# Patient Record
Sex: FEMALE | Race: WHITE | NOT HISPANIC OR LATINO
[De-identification: names, ages, dates, MRNs, and addresses within clinical notes are randomized per-mention and may not be internally consistent; named-entity substitution may affect disease eponyms.]

---

## 2018-02-26 ENCOUNTER — LABORATORY RESULT (OUTPATIENT)
Age: 68
End: 2018-02-26

## 2018-02-26 ENCOUNTER — APPOINTMENT (OUTPATIENT)
Dept: NEPHROLOGY | Facility: CLINIC | Age: 68
End: 2018-02-26

## 2018-02-26 ENCOUNTER — APPOINTMENT (OUTPATIENT)
Dept: NEPHROLOGY | Facility: CLINIC | Age: 68
End: 2018-02-26
Payer: MEDICARE

## 2018-02-26 VITALS — HEART RATE: 84 BPM | SYSTOLIC BLOOD PRESSURE: 148 MMHG | DIASTOLIC BLOOD PRESSURE: 92 MMHG

## 2018-02-26 VITALS — OXYGEN SATURATION: 96 % | HEART RATE: 88 BPM | WEIGHT: 168 LBS | HEIGHT: 60 IN | BODY MASS INDEX: 32.98 KG/M2

## 2018-02-26 VITALS — HEART RATE: 108 BPM | DIASTOLIC BLOOD PRESSURE: 100 MMHG | SYSTOLIC BLOOD PRESSURE: 164 MMHG

## 2018-02-26 VITALS — DIASTOLIC BLOOD PRESSURE: 100 MMHG | SYSTOLIC BLOOD PRESSURE: 174 MMHG

## 2018-02-26 VITALS — HEART RATE: 108 BPM

## 2018-02-26 VITALS — DIASTOLIC BLOOD PRESSURE: 100 MMHG | HEART RATE: 96 BPM | SYSTOLIC BLOOD PRESSURE: 160 MMHG

## 2018-02-26 DIAGNOSIS — Z00.00 ENCOUNTER FOR GENERAL ADULT MEDICAL EXAMINATION W/OUT ABNORMAL FINDINGS: ICD-10-CM

## 2018-02-26 PROCEDURE — 99205 OFFICE O/P NEW HI 60 MIN: CPT | Mod: 25

## 2018-02-26 PROCEDURE — 36415 COLL VENOUS BLD VENIPUNCTURE: CPT

## 2018-02-26 PROCEDURE — 93000 ELECTROCARDIOGRAM COMPLETE: CPT

## 2018-02-28 LAB
24R-OH-CALCIDIOL SERPL-MCNC: 64.9 PG/ML
25(OH)D3 SERPL-MCNC: 18 NG/ML
ALBUMIN SERPL ELPH-MCNC: 4.1 G/DL
ALDOSTERONE SERUM: 7.4 NG/DL
ALP BLD-CCNC: 77 U/L
ALP BONE SERPL-MCNC: 18 MCG/L
ALT SERPL-CCNC: 13 U/L
ANION GAP SERPL CALC-SCNC: 17 MMOL/L
APPEARANCE: CLEAR
AST SERPL-CCNC: 24 U/L
BACTERIA UR CULT: NORMAL
BACTERIA: ABNORMAL
BASOPHILS # BLD AUTO: 0.02 K/UL
BASOPHILS NFR BLD AUTO: 0.3 %
BILIRUB SERPL-MCNC: 0.5 MG/DL
BILIRUBIN URINE: NEGATIVE
BLOOD URINE: NEGATIVE
BUN SERPL-MCNC: 20 MG/DL
C3 SERPL-MCNC: 135 MG/DL
C4 SERPL-MCNC: 37 MG/DL
CALCIUM SERPL-MCNC: 9.6 MG/DL
CALCIUM SERPL-MCNC: 9.6 MG/DL
CHLORIDE SERPL-SCNC: 103 MMOL/L
CHOLEST SERPL-MCNC: 181 MG/DL
CHOLEST/HDLC SERPL: 3.9 RATIO
CO2 SERPL-SCNC: 22 MMOL/L
COLLAGEN CTX SERPL-MCNC: 189 PG/ML
COLOR: YELLOW
CORTIS SERPL-MCNC: 7.3 UG/DL
CREAT SERPL-MCNC: 0.91 MG/DL
CREAT SPEC-SCNC: 81 MG/DL
CREAT/PROT UR: 0.1 RATIO
CRP SERPL-MCNC: 0.3 MG/DL
EOSINOPHIL # BLD AUTO: 0.11 K/UL
EOSINOPHIL NFR BLD AUTO: 1.4 %
ERYTHROCYTE [SEDIMENTATION RATE] IN BLOOD BY WESTERGREN METHOD: 26 MM/HR
FERRITIN SERPL-MCNC: 182 NG/ML
FOLATE SERPL-MCNC: 9.2 NG/ML
GLUCOSE QUALITATIVE U: NEGATIVE MG/DL
GLUCOSE SERPL-MCNC: 81 MG/DL
HBA1C MFR BLD HPLC: 5.4 %
HBV SURFACE AB SER QL: NONREACTIVE
HBV SURFACE AG SER QL: NONREACTIVE
HCT VFR BLD CALC: 43.3 %
HCV AB SER QL: NONREACTIVE
HCV S/CO RATIO: 0.1 S/CO
HCYS SERPL-MCNC: 8.5 UMOL/L
HDLC SERPL-MCNC: 47 MG/DL
HGB BLD-MCNC: 14 G/DL
HYALINE CASTS: 1 /LPF
IMM GRANULOCYTES NFR BLD AUTO: 0.1 %
IRON SATN MFR SERPL: 21 %
IRON SERPL-MCNC: 55 UG/DL
KETONES URINE: NEGATIVE
LDLC SERPL CALC-MCNC: 114 MG/DL
LEUKOCYTE ESTERASE URINE: NEGATIVE
LYMPHOCYTES # BLD AUTO: 1.94 K/UL
LYMPHOCYTES NFR BLD AUTO: 25.1 %
MAGNESIUM SERPL-MCNC: 2.4 MG/DL
MAN DIFF?: NORMAL
MCHC RBC-ENTMCNC: 29.7 PG
MCHC RBC-ENTMCNC: 32.3 GM/DL
MCV RBC AUTO: 91.9 FL
MICROSCOPIC-UA: NORMAL
MONOCYTES # BLD AUTO: 0.6 K/UL
MONOCYTES NFR BLD AUTO: 7.8 %
MPO AB + PR3 PNL SER: NORMAL
NEUTROPHILS # BLD AUTO: 5.06 K/UL
NEUTROPHILS NFR BLD AUTO: 65.3 %
NITRITE URINE: NEGATIVE
PARATHYROID HORMONE INTACT: 54 PG/ML
PH URINE: 5
PHOSPHATE SERPL-MCNC: 3.3 MG/DL
PLATELET # BLD AUTO: 313 K/UL
POTASSIUM SERPL-SCNC: 4.4 MMOL/L
PROT SERPL-MCNC: 7.4 G/DL
PROT UR-MCNC: 7 MG/DL
PROTEIN URINE: NEGATIVE MG/DL
RBC # BLD: 4.71 M/UL
RBC # FLD: 13.2 %
RED BLOOD CELLS URINE: 1 /HPF
RENIN PLASMA: <2.1 PG/ML
RHEUMATOID FACT SER QL: <7 IU/ML
SODIUM SERPL-SCNC: 142 MMOL/L
SPECIFIC GRAVITY URINE: 1.02
SQUAMOUS EPITHELIAL CELLS: 1 /HPF
T3FREE SERPL-MCNC: 2.49 PG/ML
T3RU NFR SERPL: 1.08 INDEX
T4 FREE SERPL-MCNC: 1.1 NG/DL
T4 SERPL-MCNC: 7.9 UG/DL
THYROGLOB AB SERPL-ACNC: 31.1 IU/ML
THYROPEROXIDASE AB SERPL IA-ACNC: 624 IU/ML
TIBC SERPL-MCNC: 257 UG/DL
TRIGL SERPL-MCNC: 100 MG/DL
TSH SERPL-ACNC: 3.67 UIU/ML
UIBC SERPL-MCNC: 202 UG/DL
URATE SERPL-MCNC: 4.5 MG/DL
UROBILINOGEN URINE: NEGATIVE MG/DL
VIT B12 SERPL-MCNC: 459 PG/ML
WBC # FLD AUTO: 7.74 K/UL
WHITE BLOOD CELLS URINE: 1 /HPF

## 2018-03-04 LAB
ALBUMIN MFR SERPL ELPH: 60.3 %
ALBUMIN SERPL-MCNC: 4.5 G/DL
ALBUMIN/GLOB SERPL: 1.6 RATIO
ALPHA1 GLOB MFR SERPL ELPH: 4 %
ALPHA1 GLOB SERPL ELPH-MCNC: 0.3 G/DL
ALPHA2 GLOB MFR SERPL ELPH: 11.2 %
ALPHA2 GLOB SERPL ELPH-MCNC: 0.8 G/DL
ANA SER IF-ACNC: NEGATIVE
B-GLOBULIN MFR SERPL ELPH: 10.7 %
B-GLOBULIN SERPL ELPH-MCNC: 0.8 G/DL
DEPRECATED KAPPA LC FREE/LAMBDA SER: 0.8 RATIO
GAMMA GLOB FLD ELPH-MCNC: 1 G/DL
GAMMA GLOB MFR SERPL ELPH: 13.8 %
IGA SER QL IEP: 139 MG/DL
IGG SER QL IEP: 987 MG/DL
IGM SER QL IEP: 80 MG/DL
INTERPRETATION SERPL IEP-IMP: NORMAL
KAPPA LC CSF-MCNC: 1.34 MG/DL
KAPPA LC SERPL-MCNC: 1.07 MG/DL
M PROTEIN SPEC IFE-MCNC: NORMAL
METHYLMALONATE SERPL-SCNC: 129 NMOL/L
PROT SERPL-MCNC: 7.4 G/DL
PROT SERPL-MCNC: 7.4 G/DL

## 2018-03-07 ENCOUNTER — APPOINTMENT (OUTPATIENT)
Dept: NEPHROLOGY | Facility: CLINIC | Age: 68
End: 2018-03-07
Payer: MEDICARE

## 2018-03-07 VITALS — DIASTOLIC BLOOD PRESSURE: 90 MMHG | SYSTOLIC BLOOD PRESSURE: 142 MMHG

## 2018-03-07 VITALS — HEART RATE: 96 BPM | SYSTOLIC BLOOD PRESSURE: 136 MMHG | DIASTOLIC BLOOD PRESSURE: 96 MMHG

## 2018-03-07 VITALS — DIASTOLIC BLOOD PRESSURE: 90 MMHG | SYSTOLIC BLOOD PRESSURE: 120 MMHG

## 2018-03-07 VITALS — DIASTOLIC BLOOD PRESSURE: 90 MMHG | SYSTOLIC BLOOD PRESSURE: 130 MMHG

## 2018-03-07 VITALS — OXYGEN SATURATION: 97 % | BODY MASS INDEX: 32.81 KG/M2 | HEART RATE: 102 BPM | WEIGHT: 168 LBS

## 2018-03-07 VITALS — HEART RATE: 84 BPM | SYSTOLIC BLOOD PRESSURE: 130 MMHG | DIASTOLIC BLOOD PRESSURE: 90 MMHG

## 2018-03-07 PROCEDURE — 99214 OFFICE O/P EST MOD 30 MIN: CPT

## 2018-04-11 ENCOUNTER — APPOINTMENT (OUTPATIENT)
Dept: NEPHROLOGY | Facility: CLINIC | Age: 68
End: 2018-04-11
Payer: MEDICARE

## 2018-04-11 ENCOUNTER — LABORATORY RESULT (OUTPATIENT)
Age: 68
End: 2018-04-11

## 2018-04-11 VITALS — WEIGHT: 169 LBS | BODY MASS INDEX: 33.01 KG/M2 | OXYGEN SATURATION: 97 % | HEART RATE: 77 BPM

## 2018-04-11 VITALS — DIASTOLIC BLOOD PRESSURE: 82 MMHG | HEART RATE: 84 BPM | SYSTOLIC BLOOD PRESSURE: 126 MMHG

## 2018-04-11 VITALS — SYSTOLIC BLOOD PRESSURE: 126 MMHG | DIASTOLIC BLOOD PRESSURE: 80 MMHG

## 2018-04-11 DIAGNOSIS — G47.9 SLEEP DISORDER, UNSPECIFIED: ICD-10-CM

## 2018-04-11 PROCEDURE — 99214 OFFICE O/P EST MOD 30 MIN: CPT | Mod: 25

## 2018-04-11 PROCEDURE — 93000 ELECTROCARDIOGRAM COMPLETE: CPT

## 2018-04-11 PROCEDURE — 36415 COLL VENOUS BLD VENIPUNCTURE: CPT

## 2018-04-12 LAB
ALBUMIN SERPL ELPH-MCNC: 4.5 G/DL
ALP BLD-CCNC: 86 U/L
ALT SERPL-CCNC: 16 U/L
ANION GAP SERPL CALC-SCNC: 12 MMOL/L
APTT BLD: 39.6 SEC
AST SERPL-CCNC: 17 U/L
BASOPHILS # BLD AUTO: 0.01 K/UL
BASOPHILS NFR BLD AUTO: 0.1 %
BILIRUB SERPL-MCNC: 0.5 MG/DL
BUN SERPL-MCNC: 24 MG/DL
CALCIUM SERPL-MCNC: 9.7 MG/DL
CHLORIDE SERPL-SCNC: 102 MMOL/L
CHOLEST SERPL-MCNC: 229 MG/DL
CHOLEST/HDLC SERPL: 4 RATIO
CO2 SERPL-SCNC: 30 MMOL/L
CREAT SERPL-MCNC: 0.72 MG/DL
CRP SERPL-MCNC: 0.2 MG/DL
EOSINOPHIL # BLD AUTO: 0.19 K/UL
EOSINOPHIL NFR BLD AUTO: 2.7 %
ERYTHROCYTE [SEDIMENTATION RATE] IN BLOOD BY WESTERGREN METHOD: 3 MM/HR
GLUCOSE SERPL-MCNC: 76 MG/DL
HCT VFR BLD CALC: 45.2 %
HDLC SERPL-MCNC: 57 MG/DL
HGB BLD-MCNC: 14.2 G/DL
IMM GRANULOCYTES NFR BLD AUTO: 0.1 %
INR PPP: 0.97 RATIO
LDLC SERPL CALC-MCNC: 143 MG/DL
LYMPHOCYTES # BLD AUTO: 1.93 K/UL
LYMPHOCYTES NFR BLD AUTO: 27.5 %
MAGNESIUM SERPL-MCNC: 2.4 MG/DL
MAN DIFF?: NORMAL
MCHC RBC-ENTMCNC: 30 PG
MCHC RBC-ENTMCNC: 31.4 GM/DL
MCV RBC AUTO: 95.4 FL
MONOCYTES # BLD AUTO: 0.39 K/UL
MONOCYTES NFR BLD AUTO: 5.5 %
NEUTROPHILS # BLD AUTO: 4.5 K/UL
NEUTROPHILS NFR BLD AUTO: 64.1 %
PHOSPHATE SERPL-MCNC: 3.4 MG/DL
PLATELET # BLD AUTO: 233 K/UL
POTASSIUM SERPL-SCNC: 4.2 MMOL/L
PROT SERPL-MCNC: 7.6 G/DL
PT BLD: 11 SEC
RBC # BLD: 4.74 M/UL
RBC # FLD: 14.4 %
SODIUM SERPL-SCNC: 144 MMOL/L
T3RU NFR SERPL: 0.99 INDEX
T4 SERPL-MCNC: 8.2 UG/DL
TRIGL SERPL-MCNC: 145 MG/DL
TSH SERPL-ACNC: 5.52 UIU/ML
URATE SERPL-MCNC: 4.1 MG/DL
WBC # FLD AUTO: 7.03 K/UL

## 2018-04-13 ENCOUNTER — OUTPATIENT (OUTPATIENT)
Dept: OUTPATIENT SERVICES | Facility: HOSPITAL | Age: 68
LOS: 1 days | End: 2018-04-13
Payer: MEDICARE

## 2018-04-13 DIAGNOSIS — R06.09 OTHER FORMS OF DYSPNEA: ICD-10-CM

## 2018-04-13 PROCEDURE — 78452 HT MUSCLE IMAGE SPECT MULT: CPT | Mod: 26

## 2018-04-13 PROCEDURE — 78452 HT MUSCLE IMAGE SPECT MULT: CPT

## 2018-04-13 PROCEDURE — 93018 CV STRESS TEST I&R ONLY: CPT

## 2018-04-13 PROCEDURE — 93017 CV STRESS TEST TRACING ONLY: CPT

## 2018-04-13 PROCEDURE — A9505: CPT

## 2018-04-13 PROCEDURE — A9500: CPT

## 2018-04-13 PROCEDURE — 93016 CV STRESS TEST SUPVJ ONLY: CPT

## 2018-05-03 ENCOUNTER — LABORATORY RESULT (OUTPATIENT)
Age: 68
End: 2018-05-03

## 2018-05-03 ENCOUNTER — APPOINTMENT (OUTPATIENT)
Dept: NEPHROLOGY | Facility: CLINIC | Age: 68
End: 2018-05-03
Payer: MEDICARE

## 2018-05-03 VITALS — DIASTOLIC BLOOD PRESSURE: 80 MMHG | HEART RATE: 60 BPM | SYSTOLIC BLOOD PRESSURE: 110 MMHG

## 2018-05-03 VITALS — OXYGEN SATURATION: 94 % | HEART RATE: 60 BPM

## 2018-05-03 VITALS — BODY MASS INDEX: 32.81 KG/M2 | WEIGHT: 168 LBS

## 2018-05-03 VITALS — DIASTOLIC BLOOD PRESSURE: 70 MMHG | SYSTOLIC BLOOD PRESSURE: 112 MMHG

## 2018-05-03 DIAGNOSIS — R79.1 ABNORMAL COAGULATION PROFILE: ICD-10-CM

## 2018-05-03 PROCEDURE — 36415 COLL VENOUS BLD VENIPUNCTURE: CPT

## 2018-05-03 PROCEDURE — 99215 OFFICE O/P EST HI 40 MIN: CPT | Mod: 25

## 2018-05-06 LAB
APTT BLD: 37.3 SEC
FACT VIII ACT/NOR PPP: 83 %
FACT XI ACT/NOR PPP: 122 %
T3FREE SERPL-MCNC: 3.04 PG/ML
T3RU NFR SERPL: 1.03 INDEX
T4 FREE SERPL-MCNC: 1.3 NG/DL
T4 SERPL-MCNC: 7.7 UG/DL
THYROGLOB AB SERPL-ACNC: 21.9 IU/ML
THYROPEROXIDASE AB SERPL IA-ACNC: 558 IU/ML
TSH SERPL-ACNC: 4.69 UIU/ML

## 2018-10-10 ENCOUNTER — INBOUND DOCUMENT (OUTPATIENT)
Age: 68
End: 2018-10-10

## 2019-08-02 RX ORDER — METOPROLOL SUCCINATE 50 MG/1
50 TABLET, EXTENDED RELEASE ORAL
Qty: 180 | Refills: 3 | Status: ACTIVE | COMMUNITY
Start: 2017-09-11 | End: 1900-01-01

## 2019-08-21 ENCOUNTER — APPOINTMENT (OUTPATIENT)
Dept: NEPHROLOGY | Facility: CLINIC | Age: 69
End: 2019-08-21
Payer: MEDICARE

## 2019-08-21 ENCOUNTER — LABORATORY RESULT (OUTPATIENT)
Age: 69
End: 2019-08-21

## 2019-08-21 VITALS — DIASTOLIC BLOOD PRESSURE: 86 MMHG | SYSTOLIC BLOOD PRESSURE: 126 MMHG

## 2019-08-21 VITALS — SYSTOLIC BLOOD PRESSURE: 140 MMHG | DIASTOLIC BLOOD PRESSURE: 84 MMHG

## 2019-08-21 VITALS — HEART RATE: 90 BPM | BODY MASS INDEX: 34.57 KG/M2 | OXYGEN SATURATION: 98 % | WEIGHT: 177 LBS

## 2019-08-21 VITALS — DIASTOLIC BLOOD PRESSURE: 90 MMHG | SYSTOLIC BLOOD PRESSURE: 136 MMHG

## 2019-08-21 DIAGNOSIS — M79.672 PAIN IN RIGHT FOOT: ICD-10-CM

## 2019-08-21 DIAGNOSIS — M79.671 PAIN IN RIGHT FOOT: ICD-10-CM

## 2019-08-21 DIAGNOSIS — R29.890 LOSS OF HEIGHT: ICD-10-CM

## 2019-08-21 DIAGNOSIS — Z63.79 OTHER STRESSFUL LIFE EVENTS AFFECTING FAMILY AND HOUSEHOLD: ICD-10-CM

## 2019-08-21 PROCEDURE — 93000 ELECTROCARDIOGRAM COMPLETE: CPT

## 2019-08-21 PROCEDURE — 36415 COLL VENOUS BLD VENIPUNCTURE: CPT

## 2019-08-21 PROCEDURE — 99214 OFFICE O/P EST MOD 30 MIN: CPT | Mod: 25

## 2019-08-21 RX ORDER — DICLOFENAC SODIUM 75 MG/1
75 TABLET, DELAYED RELEASE ORAL
Qty: 90 | Refills: 0 | Status: ACTIVE | COMMUNITY
Start: 2019-08-21 | End: 1900-01-01

## 2019-08-21 NOTE — ASSESSMENT
[FreeTextEntry1] : Plan:\par 1) HTN: BP acceptable today on current regimen and therefore will not adjust patient's antihypertensive medications, will reassess pressure and regimen at next evaluation. EKG taken today shows normal besides sinus arrhythmia. \par 2) Bilateral heel pain: Have referred pt for plain films of her heels bilaterally to r/o heel spurs. Have referred patient to Dr. Santana for podiatry evaluation.\par 3) Gave patient the contact information for Dr. Carmona for routine gynecology evaluation.\par 4) Referred pt for a b/l screening mammogram in [facility].\par 5) Advised patient to have a routine bone density.\par 6) Pt declines a dermatology referral at this time.\par \par Changes to Medications: none\par \par EKG taken today, results above. Labs were drawn and patient will return in _ for a follow-up appointment.

## 2019-08-21 NOTE — HISTORY OF PRESENT ILLNESS
[FreeTextEntry1] : The patient is a 70 y/o female being seen for a f/u visit, with PMH of HTN, sleep disorder, and arthritis.\par \par Interval Data:\par - Labs from 5/3/19 reveal: TSH 4.69, thyroid peroxidase antibody 558.\par \par Current complaints:\par - Since our last visit pt has had b/l knee arthroplasty and reports no complications. She c/o heel pain when walking. She has seen a podiatrist, Dr. Lowery, in [facility]. Pt also c/o mild joint pain in her elbows. She denies a FHx of psoriasis. She reports a loss of about 3" in height, and that she "does not walk straight or stand straight." She denies leg cramps, and she reports carpal tunnel in her left hand.\par - Patient has no had a gyn evaluation in many years. She also requests a referral for mammography. She believes her last bone density was 4 years ago. Reports she sees Dr. Johansen for colonoscopy and is up to date. She had an ophthalmology evaluation with Dr. Venkatesh Cooper last year.\par - She reports that she has not taken her medications today.\par \par Current medications: diclofenac 75mg prn, and metoprolol succinate 50mg BID.

## 2019-08-21 NOTE — ADDENDUM
[FreeTextEntry1] :  Documented by Asif Bowman acting as a scribe for Dr. Brock Hamilton on 08/21/2019.

## 2019-08-21 NOTE — END OF VISIT
[FreeTextEntry3] : All medical record entries made by the Scribe were at my, Dr. Brock Hamilton, direction and personally dictated by me on 08/21/2019. I have reviewed the chart and agree that the record accurately reflects my personal performance of the history, physical exam, assessment and plan. I have also personally directed, reviewed, and agreed with the chart.

## 2019-08-26 LAB
24R-OH-CALCIDIOL SERPL-MCNC: 46.5 PG/ML
25(OH)D3 SERPL-MCNC: 25.2 NG/ML
ALBUMIN MFR SERPL ELPH: 64.7 %
ALBUMIN SERPL ELPH-MCNC: 4.4 G/DL
ALBUMIN SERPL-MCNC: 4.4 G/DL
ALBUMIN/GLOB SERPL: 1.8 RATIO
ALDOSTERONE SERUM: 7.7 NG/DL
ALP BLD-CCNC: 83 U/L
ALP BONE SERPL-MCNC: 16 MCG/L
ALPHA1 GLOB MFR SERPL ELPH: 3.1 %
ALPHA1 GLOB SERPL ELPH-MCNC: 0.2 G/DL
ALPHA2 GLOB MFR SERPL ELPH: 8.6 %
ALPHA2 GLOB SERPL ELPH-MCNC: 0.6 G/DL
ALT SERPL-CCNC: 19 U/L
ANA SER IF-ACNC: NEGATIVE
ANION GAP SERPL CALC-SCNC: 9 MMOL/L
APPEARANCE: CLEAR
AST SERPL-CCNC: 18 U/L
B-GLOBULIN MFR SERPL ELPH: 9.9 %
B-GLOBULIN SERPL ELPH-MCNC: 0.7 G/DL
BACTERIA: NEGATIVE
BASOPHILS # BLD AUTO: 0.03 K/UL
BASOPHILS NFR BLD AUTO: 0.6 %
BILIRUB SERPL-MCNC: 0.4 MG/DL
BILIRUBIN URINE: NEGATIVE
BLOOD URINE: NEGATIVE
BUN SERPL-MCNC: 22 MG/DL
C3 SERPL-MCNC: 113 MG/DL
C4 SERPL-MCNC: 30 MG/DL
CALCIUM SERPL-MCNC: 9.3 MG/DL
CALCIUM SERPL-MCNC: 9.3 MG/DL
CHLORIDE SERPL-SCNC: 109 MMOL/L
CHOLEST SERPL-MCNC: 188 MG/DL
CHOLEST/HDLC SERPL: 3.8 RATIO
CO2 SERPL-SCNC: 27 MMOL/L
COLLAGEN CTX SERPL-MCNC: 387 PG/ML
COLOR: YELLOW
CREAT SERPL-MCNC: 0.86 MG/DL
CRP SERPL-MCNC: 0.17 MG/DL
DEPRECATED KAPPA LC FREE/LAMBDA SER: 0.95 RATIO
DEPRECATED KAPPA LC FREE/LAMBDA SER: 0.95 RATIO
ENA SCL70 IGG SER IA-ACNC: <0.2 AL
EOSINOPHIL # BLD AUTO: 0.19 K/UL
EOSINOPHIL NFR BLD AUTO: 3.8 %
ERYTHROCYTE [SEDIMENTATION RATE] IN BLOOD BY WESTERGREN METHOD: 13 MM/HR
ESTIMATED AVERAGE GLUCOSE: 111 MG/DL
FERRITIN SERPL-MCNC: 130 NG/ML
FOLATE SERPL-MCNC: 16.9 NG/ML
GAMMA GLOB FLD ELPH-MCNC: 0.9 G/DL
GAMMA GLOB MFR SERPL ELPH: 13.7 %
GLUCOSE QUALITATIVE U: NEGATIVE
GLUCOSE SERPL-MCNC: 83 MG/DL
HBA1C MFR BLD HPLC: 5.5 %
HBV SURFACE AB SER QL: NONREACTIVE
HBV SURFACE AG SER QL: NONREACTIVE
HCT VFR BLD CALC: 42.6 %
HCV AB SER QL: NONREACTIVE
HCV S/CO RATIO: 0.08 S/CO
HCYS SERPL-MCNC: 9.8 UMOL/L
HDLC SERPL-MCNC: 49 MG/DL
HGB BLD-MCNC: 13.6 G/DL
HYALINE CASTS: 1 /LPF
IGA SER QL IEP: 145 MG/DL
IGG SER QL IEP: 952 MG/DL
IGM SER QL IEP: 77 MG/DL
IMM GRANULOCYTES NFR BLD AUTO: 0 %
INTERPRETATION SERPL IEP-IMP: NORMAL
IRON SATN MFR SERPL: 30 %
IRON SERPL-MCNC: 77 UG/DL
KAPPA LC CSF-MCNC: 1.17 MG/DL
KAPPA LC CSF-MCNC: 1.17 MG/DL
KAPPA LC SERPL-MCNC: 1.11 MG/DL
KAPPA LC SERPL-MCNC: 1.11 MG/DL
KETONES URINE: NEGATIVE
LDLC SERPL CALC-MCNC: 120 MG/DL
LEUKOCYTE ESTERASE URINE: NEGATIVE
LYMPHOCYTES # BLD AUTO: 1.96 K/UL
LYMPHOCYTES NFR BLD AUTO: 39.4 %
M PROTEIN SPEC IFE-MCNC: NORMAL
MAGNESIUM SERPL-MCNC: 2.3 MG/DL
MAN DIFF?: NORMAL
MCHC RBC-ENTMCNC: 30 PG
MCHC RBC-ENTMCNC: 31.9 GM/DL
MCV RBC AUTO: 93.8 FL
MICROSCOPIC-UA: NORMAL
MONOCYTES # BLD AUTO: 0.29 K/UL
MONOCYTES NFR BLD AUTO: 5.8 %
MPO AB + PR3 PNL SER: NORMAL
NEUTROPHILS # BLD AUTO: 2.5 K/UL
NEUTROPHILS NFR BLD AUTO: 50.4 %
NITRITE URINE: NEGATIVE
PARATHYROID HORMONE INTACT: 51 PG/ML
PH URINE: 6
PHOSPHATE SERPL-MCNC: 3.4 MG/DL
PLATELET # BLD AUTO: 203 K/UL
POTASSIUM SERPL-SCNC: 4.1 MMOL/L
PROT SERPL-MCNC: 6.8 G/DL
PROTEIN URINE: NORMAL
RBC # BLD: 4.54 M/UL
RBC # FLD: 13.4 %
RED BLOOD CELLS URINE: 1 /HPF
RENIN PLASMA: <2.1 PG/ML
RHEUMATOID FACT SER QL: <10 IU/ML
SODIUM SERPL-SCNC: 145 MMOL/L
SPECIFIC GRAVITY URINE: 1.03
SQUAMOUS EPITHELIAL CELLS: 5 /HPF
T3FREE SERPL-MCNC: 2.98 PG/ML
T3RU NFR SERPL: 1.1 TBI
T4 FREE SERPL-MCNC: 1.1 NG/DL
T4 SERPL-MCNC: 6.4 UG/DL
THYROGLOB AB SERPL-ACNC: 38.2 IU/ML
THYROPEROXIDASE AB SERPL IA-ACNC: 964 IU/ML
TIBC SERPL-MCNC: 259 UG/DL
TRIGL SERPL-MCNC: 94 MG/DL
TSH SERPL-ACNC: 6.61 UIU/ML
UIBC SERPL-MCNC: 182 UG/DL
URATE SERPL-MCNC: 4.5 MG/DL
UROBILINOGEN URINE: NORMAL
VIT B12 SERPL-MCNC: 414 PG/ML
WBC # FLD AUTO: 4.97 K/UL
WHITE BLOOD CELLS URINE: 3 /HPF

## 2019-09-02 LAB
C1Q IMMUNE COMPLEX: <1.2 UG EQ/ML
C3D IMMUNE COMPLEXES: 11.4 UG EQ/ML
HLA-B27 RELATED AG QL: NEGATIVE
METHYLMALONATE SERPL-SCNC: 163 NMOL/L

## 2019-09-18 ENCOUNTER — APPOINTMENT (OUTPATIENT)
Dept: NEPHROLOGY | Facility: CLINIC | Age: 69
End: 2019-09-18
Payer: MEDICARE

## 2019-09-18 VITALS — HEART RATE: 72 BPM | SYSTOLIC BLOOD PRESSURE: 128 MMHG | DIASTOLIC BLOOD PRESSURE: 72 MMHG

## 2019-09-18 VITALS — BODY MASS INDEX: 34.57 KG/M2 | WEIGHT: 177 LBS | OXYGEN SATURATION: 97 % | HEART RATE: 65 BPM

## 2019-09-18 VITALS — HEART RATE: 72 BPM | SYSTOLIC BLOOD PRESSURE: 134 MMHG | DIASTOLIC BLOOD PRESSURE: 72 MMHG

## 2019-09-18 VITALS — DIASTOLIC BLOOD PRESSURE: 78 MMHG | SYSTOLIC BLOOD PRESSURE: 120 MMHG

## 2019-09-18 DIAGNOSIS — G47.62 SLEEP RELATED LEG CRAMPS: ICD-10-CM

## 2019-09-18 PROCEDURE — 99214 OFFICE O/P EST MOD 30 MIN: CPT | Mod: 25

## 2019-09-18 NOTE — REASON FOR VISIT
[Follow-Up] : a follow-up visit [FreeTextEntry1] : for blood pressure management and active reassessment of hyperthyroidism.

## 2019-09-18 NOTE — ASSESSMENT
[FreeTextEntry1] : Plan:\par 1) Hypertension: BP acceptable today on current regimen and therefore will not adjust patient's antihypertensive medications, will reassess pressure and regimen at next evaluation.\par 2) Hyperlipidemia: lipids found to be slightly elevated at this time. However, due to patient's hypothyroidism, now treated with levothyroxine, patient will not require medication at this time. Will continue to monitor with lipid profile on blood work in the future.\par 3) Hypothyroidism: Patient was recently started on Levothyroxine 25mcg QD and reports that her fatigue has diminished. Will not alter therapy but will continue to monitor on blood work at future visits.\par 4) Bone Density Assessment: Have reviewed the results of patient's completed bone density assessment, which revealed osteopenia. Have referred the patient to complete an endocrinologic evaluation with Dr. Ankita Harris.\par 5) Mammography: Have reviewed the result's of patient's completed bilateral mammogram, which was normal; note on record.\par 6) Vitamin D Deficiency: Patient's blood results revealed a decreased vitamin D 25 hydroxy of 25.2. Have instructed the patient to start Vitamin D 1000 units. Will retest at future visits.\par \par Changes to Medications: Start Vitamin D 1000 units.\par \par Labs were not drawn today and patient will return in 4-6 weeks for a follow-up appointment.

## 2019-09-18 NOTE — PHYSICAL EXAM
[General Appearance - Alert] : alert [General Appearance - In No Acute Distress] : in no acute distress [Sclera] : the sclera and conjunctiva were normal [Extraocular Movements] : extraocular movements were intact [PERRL With Normal Accommodation] : pupils were equal in size, round, and reactive to light [Outer Ear] : the ears and nose were normal in appearance [Oropharynx] : the oropharynx was normal [Neck Appearance] : the appearance of the neck was normal [Neck Cervical Mass (___cm)] : no neck mass was observed [Jugular Venous Distention Increased] : there was no jugular-venous distention [Thyroid Diffuse Enlargement] : the thyroid was not enlarged [Thyroid Nodule] : there were no palpable thyroid nodules [Auscultation Breath Sounds / Voice Sounds] : lungs were clear to auscultation bilaterally [Heart Rate And Rhythm] : heart rate was normal and rhythm regular [Heart Sounds] : normal S1 and S2 [Murmurs] : no murmurs [Heart Sounds Gallop] : no gallops [Heart Sounds Pericardial Friction Rub] : no pericardial rub [Edema] : there was no peripheral edema [Bowel Sounds] : normal bowel sounds [Abdomen Soft] : soft [Abdomen Tenderness] : non-tender [Abdomen Mass (___ Cm)] : no abdominal mass palpated [No Spinal Tenderness] : no spinal tenderness [No CVA Tenderness] : no ~M costovertebral angle tenderness [Abnormal Walk] : normal gait [Nail Clubbing] : no clubbing  or cyanosis of the fingernails [Motor Tone] : muscle strength and tone were normal [Musculoskeletal - Swelling] : no joint swelling seen [Skin Turgor] : normal skin turgor [Skin Color & Pigmentation] : normal skin color and pigmentation [] : no rash [Motor Exam] : the motor exam was normal [Cranial Nerves] : cranial nerves 2-12 were intact [No Focal Deficits] : no focal deficits [Oriented To Time, Place, And Person] : oriented to person, place, and time [Impaired Insight] : insight and judgment were intact [Affect] : the affect was normal

## 2019-09-18 NOTE — END OF VISIT
[FreeTextEntry3] : All medical record entries made by the Scribe were at my, Dr. Brock Hamilton, direction and personally dictated by me on 09/18/2019 . I have reviewed the chart and agree that the record accurately reflects my personal performance of the history, physical exam, assessment and plan. I have also personally directed, reviewed, and agreed with the chart.

## 2019-09-18 NOTE — HISTORY OF PRESENT ILLNESS
[FreeTextEntry1] : The patient is a 69 year old female being seen for a follow-up visit, blood pressure management, and active reassessment of hypothyroidism, sleep disorder, and arthritis.\par \par Interval Data:\par - Labs from 5/3/19 reveal: TSH 6.61, PTH 51, calcium 9.3, B12 414, folate 16.9, vitamin D 25 hydroxy 25.2, total cholesterol: 188, , HDL 49\par - She has started Levothyroxine 25mcg QD as instructed, and reports she has tolerated this change well thus far.\par - Patient completed a podiatric evaluation with Dr. Jose Morales as advised in view of her bilateral heel pain, who administered bilateral cortisone injections. The patient reports that the injections improved, but did not abolish her pain.\par - She completed a bone density assessment on 09/11/19, which revealed osteopenia; note on record.\par - Patient completed a bilateral mammogram, which was normal; note on record.\par \par Current complaints:\par - Patient reports joint pain, but otherwise reports that she is feeling generally well, with no acute complaints.\par - She notes that since she has started Levothyroxine, her fatigue has diminished.\par \par Current medications: diclofenac 75mg prn, metoprolol succinate 50mg BID, Levothyroxine 25mcg QD.

## 2019-09-18 NOTE — ADDENDUM
[FreeTextEntry1] : Documented by Max Martinez, solely acting as a scribe for Dr. Brock Hamilton on 09/18/2019.

## 2020-04-26 RX ORDER — CLONIDINE HYDROCHLORIDE 0.1 MG/1
0.1 TABLET ORAL
Qty: 30 | Refills: 1 | Status: ACTIVE | COMMUNITY
Start: 2020-04-26 | End: 1900-01-01

## 2020-04-29 ENCOUNTER — APPOINTMENT (OUTPATIENT)
Dept: NEPHROLOGY | Facility: CLINIC | Age: 70
End: 2020-04-29

## 2020-04-29 NOTE — END OF VISIT
[Time Spent: ___ minutes] : I have spent [unfilled] minutes of face to face time with the patient [FreeTextEntry3] : All medical record entries made by the Scribe were at my, Dr. Brock Hamilton, direction and personally dictated by me on 04/29/2020. I have reviewed the chart and agree that the record accurately reflects my personal performance of the history, physical exam, assessment and plan. I have also personally directed, reviewed, and agreed with the chart.

## 2020-04-29 NOTE — ASSESSMENT
[FreeTextEntry1] : \par \par Plan:\par 1) HTN:\par 2) HLD: lipid profile on recent labs acceptable other than elevated triglycerides. Continue therapy and reasasess on repeat labs.\par 3) Hypothyroidism: TSH elevated on recent labs at 10.55. \par 4) Advised patient to continue quarantine throughout the duration of the covid pandemic. \par \par Plan to reconvene with patient in _ and will decide whether via telemedicine or in person in office.

## 2020-04-29 NOTE — HISTORY OF PRESENT ILLNESS
[Other Location: e.g. Home (Enter Location, City,State)___] : at [unfilled] [Home] : at home, [unfilled] , at the time of the visit. [Patient] : the patient [Self] : self [___ Month(s) Ago] : [unfilled] month(s) ago [Primary] : primary hypertension [None] : ~He/She~ has no significant interval events [de-identified] : hypothyroidism, arthritis [FreeTextEntry1] : Patient was last seen in office in September 2019.\par \par \par \par Labs from 4/26/20 reveal: BUN 24, BUN/creat ratio 40, iron sat 18%, triglycerides 226, TSH 10.55.\par \par Current medications: diclofenac 75mg prn, metoprolol succinate 50mg BID, Levothyroxine 25mcg QD, Vit D 1000units QD.

## 2020-04-29 NOTE — ADDENDUM
[FreeTextEntry1] : Documented by Asif Bowman acting as a scribe for Dr. Brock Hamilton on 04/29/2020. \par

## 2020-04-29 NOTE — PHYSICAL EXAM
[General Appearance - Alert] : alert [General Appearance - In No Acute Distress] : in no acute distress [Extraocular Movements] : extraocular movements were intact [Outer Ear] : the ears and nose were normal in appearance [Neck Appearance] : the appearance of the neck was normal [] : no respiratory distress [Exaggerated Use Of Accessory Muscles For Inspiration] : no accessory muscle use [Respiration, Rhythm And Depth] : normal respiratory rhythm and effort [Edema] : there was no peripheral edema [Involuntary Movements] : no involuntary movements were seen [Skin Color & Pigmentation] : normal skin color and pigmentation [No Focal Deficits] : no focal deficits [Oriented To Time, Place, And Person] : oriented to person, place, and time [Impaired Insight] : insight and judgment were intact [Affect] : the affect was normal [FreeTextEntry1] : no evidence of deformity of discomfort.

## 2020-04-30 ENCOUNTER — APPOINTMENT (OUTPATIENT)
Dept: NEPHROLOGY | Facility: CLINIC | Age: 70
End: 2020-04-30
Payer: MEDICARE

## 2020-04-30 DIAGNOSIS — R63.5 ABNORMAL WEIGHT GAIN: ICD-10-CM

## 2020-04-30 DIAGNOSIS — R51 HEADACHE: ICD-10-CM

## 2020-04-30 PROCEDURE — 99214 OFFICE O/P EST MOD 30 MIN: CPT | Mod: 95

## 2020-04-30 NOTE — HISTORY OF PRESENT ILLNESS
[___ Month(s) Ago] : [unfilled] month(s) ago [None] : no changes  [Primary] : primary hypertension [Home] : at home, [unfilled] , at the time of the visit. [Other Location: e.g. Home (Enter Location, City,State)___] : at [unfilled] [Self] : self [Patient] : the patient [de-identified] : covid infection [de-identified] : hypothyroidism, arthritis [FreeTextEntry1] : Patient was last seen in office in September 2019.\par \par Patient is a 70 year old female with previously stable HTN, who called me by phone last week with BP as high as 211/109. Since last visit she had a covid infection from which she was sick for several days and then rapidly improved, swab positive for covid. Now back to normal with normal exercise tolerance and RA O2 sat 98%. But her BP was 211/109 and over the phone I treated her with addition of Catapres patch #2 and amlodipine 5mg, and her pressure decreased to approximately 160/80 range. Now f/u to ensure BP improves further, but she relates that even though her headaches associated with pressures over 200 systolic are gone, her BP is still elevate. This morning before her meds, her pressure was 201/117 with pulse 72, and then after resting a few minute s188/94 with pulse 76. She is not symptomatic at that pressure. Otherwise she feels acceptable. Of note, her TSH shows worsening of her hypothyroidism and she reports that she arbitrarily stopped her Syntroid on her own because "it didn't seem to make a difference and she didn't think she needed it."\par \par Labs from 4/26/20 reveal: BUN 24, BUN/creat ratio 40, iron sat 18%, triglycerides 226, TSH 10.55.\par \par Current medications: diclofenac 75mg prn, metoprolol succinate 50mg BID, Vit D 1000units QD.\par Patient has not been taking Levothyroxine 25mcg QD.

## 2020-04-30 NOTE — PHYSICAL EXAM
[General Appearance - Alert] : alert [General Appearance - In No Acute Distress] : in no acute distress [Extraocular Movements] : extraocular movements were intact [Outer Ear] : the ears and nose were normal in appearance [] : no respiratory distress [Neck Appearance] : the appearance of the neck was normal [Respiration, Rhythm And Depth] : normal respiratory rhythm and effort [Exaggerated Use Of Accessory Muscles For Inspiration] : no accessory muscle use [Edema] : there was no peripheral edema [Involuntary Movements] : no involuntary movements were seen [Skin Color & Pigmentation] : normal skin color and pigmentation [No Focal Deficits] : no focal deficits [Oriented To Time, Place, And Person] : oriented to person, place, and time [Impaired Insight] : insight and judgment were intact [Affect] : the affect was normal [FreeTextEntry1] : no evidence of deformity of discomfort.

## 2020-04-30 NOTE — ASSESSMENT
[FreeTextEntry1] : Plan is to increase amlodipine to 5mg BID, today only take clonidine oral 0.1mg TID in addition to ongoing clonidine patch, and add HCTZ 25mg QD starting today. And resume levothyroxine 25mcg QD this week, and next week increase to 50mcg QD. I will renew these meds at [facility] in [facility] and we will intend to get [facility] to do sa home visit in [place] next week and we should send them instructions to her email: [email or web address]. \par The pt would like to check covid antibodies and we will attempt to do so if available. Plan to reassess next week.\par \par Plan:\par 1) HTN: accelerated HTN for unclear reasons, possibly related to hypothyroidism, her increase in weight of 5lbs during her covid isolation, or other possible factors. \par 2) HLD: lipid profile on recent labs acceptable other than elevated triglycerides. Continue therapy and reassess on repeat labs.\par 3) Hypothyroidism: TSH elevated on recent labs at 10.55. Have admonished pt to no d/c her meds without discussion. She will resume and will repeat TSH.\par 4) Weight gain and diet change: instructed that now upon return to her own home from her daughter's house, she should return to previous diet.\par 5) Recent covid infection. Will check labs from covid antibodies.\par 4) Advised patient to continue quarantine throughout the duration of the covid pandemic. \par \par Plan to reconvene with patient in one week via telemedicine.

## 2020-05-18 RX ORDER — HYDROCHLOROTHIAZIDE 25 MG/1
25 TABLET ORAL
Qty: 90 | Refills: 0 | Status: ACTIVE | COMMUNITY
Start: 2020-05-18 | End: 1900-01-01

## 2020-06-10 ENCOUNTER — APPOINTMENT (OUTPATIENT)
Dept: NEPHROLOGY | Facility: CLINIC | Age: 70
End: 2020-06-10
Payer: MEDICARE

## 2020-06-10 ENCOUNTER — LABORATORY RESULT (OUTPATIENT)
Age: 70
End: 2020-06-10

## 2020-06-10 VITALS — OXYGEN SATURATION: 98 % | BODY MASS INDEX: 35.47 KG/M2 | TEMPERATURE: 99.3 F | WEIGHT: 181.6 LBS | HEART RATE: 66 BPM

## 2020-06-10 VITALS — SYSTOLIC BLOOD PRESSURE: 109 MMHG | HEART RATE: 73 BPM | DIASTOLIC BLOOD PRESSURE: 77 MMHG

## 2020-06-10 VITALS — SYSTOLIC BLOOD PRESSURE: 119 MMHG | HEART RATE: 74 BPM | DIASTOLIC BLOOD PRESSURE: 85 MMHG

## 2020-06-10 VITALS — HEART RATE: 84 BPM | SYSTOLIC BLOOD PRESSURE: 104 MMHG | DIASTOLIC BLOOD PRESSURE: 66 MMHG

## 2020-06-10 VITALS — SYSTOLIC BLOOD PRESSURE: 130 MMHG | HEART RATE: 66 BPM | DIASTOLIC BLOOD PRESSURE: 80 MMHG

## 2020-06-10 DIAGNOSIS — M17.10 UNILATERAL PRIMARY OSTEOARTHRITIS, UNSPECIFIED KNEE: ICD-10-CM

## 2020-06-10 DIAGNOSIS — Z01.818 ENCOUNTER FOR OTHER PREPROCEDURAL EXAMINATION: ICD-10-CM

## 2020-06-10 PROCEDURE — 99214 OFFICE O/P EST MOD 30 MIN: CPT | Mod: 25

## 2020-06-10 PROCEDURE — 93000 ELECTROCARDIOGRAM COMPLETE: CPT

## 2020-06-10 PROCEDURE — 36415 COLL VENOUS BLD VENIPUNCTURE: CPT

## 2020-06-10 NOTE — PHYSICAL EXAM
[General Appearance - Alert] : alert [General Appearance - In No Acute Distress] : in no acute distress [Sclera] : the sclera and conjunctiva were normal [PERRL With Normal Accommodation] : pupils were equal in size, round, and reactive to light [Extraocular Movements] : extraocular movements were intact [Outer Ear] : the ears and nose were normal in appearance [Oropharynx] : the oropharynx was normal [Neck Cervical Mass (___cm)] : no neck mass was observed [Neck Appearance] : the appearance of the neck was normal [Thyroid Diffuse Enlargement] : the thyroid was not enlarged [Jugular Venous Distention Increased] : there was no jugular-venous distention [Thyroid Nodule] : there were no palpable thyroid nodules [Auscultation Breath Sounds / Voice Sounds] : lungs were clear to auscultation bilaterally [Heart Rate And Rhythm] : heart rate was normal and rhythm regular [Heart Sounds] : normal S1 and S2 [Heart Sounds Gallop] : no gallops [Murmurs] : no murmurs [Edema] : there was no peripheral edema [Heart Sounds Pericardial Friction Rub] : no pericardial rub [Abdomen Soft] : soft [Bowel Sounds] : normal bowel sounds [Abdomen Tenderness] : non-tender [Abdomen Mass (___ Cm)] : no abdominal mass palpated [Nail Clubbing] : no clubbing  or cyanosis of the fingernails [Skin Color & Pigmentation] : normal skin color and pigmentation [] : no rash [Skin Turgor] : normal skin turgor [No Focal Deficits] : no focal deficits [Oriented To Time, Place, And Person] : oriented to person, place, and time [Impaired Insight] : insight and judgment were intact [Affect] : the affect was normal

## 2020-06-11 ENCOUNTER — LABORATORY RESULT (OUTPATIENT)
Age: 70
End: 2020-06-11

## 2020-06-17 LAB
24R-OH-CALCIDIOL SERPL-MCNC: 38.5 PG/ML
25(OH)D3 SERPL-MCNC: 20.5 NG/ML
ALBUMIN MFR SERPL ELPH: 60.7 %
ALBUMIN SERPL ELPH-MCNC: 4.2 G/DL
ALBUMIN SERPL-MCNC: 4 G/DL
ALBUMIN/GLOB SERPL: 1.5 RATIO
ALDOSTERONE SERUM: 9.3 NG/DL
ALP BLD-CCNC: 86 U/L
ALP BONE SERPL-MCNC: 17 MCG/L
ALPHA1 GLOB MFR SERPL ELPH: 3.8 %
ALPHA1 GLOB SERPL ELPH-MCNC: 0.3 G/DL
ALPHA2 GLOB MFR SERPL ELPH: 10.2 %
ALPHA2 GLOB SERPL ELPH-MCNC: 0.7 G/DL
ALT SERPL-CCNC: 14 U/L
ANA SER IF-ACNC: NEGATIVE
ANION GAP SERPL CALC-SCNC: 14 MMOL/L
APPEARANCE: CLEAR
APTT BLD: 38.5 SEC
AST SERPL-CCNC: 18 U/L
B-GLOBULIN MFR SERPL ELPH: 11 %
B-GLOBULIN SERPL ELPH-MCNC: 0.7 G/DL
BACTERIA UR CULT: ABNORMAL
BACTERIA: ABNORMAL
BASOPHILS # BLD AUTO: 0.03 K/UL
BASOPHILS NFR BLD AUTO: 0.5 %
BILIRUB SERPL-MCNC: 0.4 MG/DL
BILIRUBIN URINE: NEGATIVE
BLOOD URINE: NEGATIVE
BUN SERPL-MCNC: 29 MG/DL
C3 SERPL-MCNC: 140 MG/DL
C4 SERPL-MCNC: 35 MG/DL
CALCIUM SERPL-MCNC: 9.3 MG/DL
CALCIUM SERPL-MCNC: 9.3 MG/DL
CHLORIDE SERPL-SCNC: 103 MMOL/L
CHOLEST SERPL-MCNC: 186 MG/DL
CHOLEST/HDLC SERPL: 3.7 RATIO
CO2 SERPL-SCNC: 24 MMOL/L
COLLAGEN CTX SERPL-MCNC: 235 PG/ML
COLOR: NORMAL
CREAT SERPL-MCNC: 0.85 MG/DL
CREAT SPEC-SCNC: 61 MG/DL
CREAT/PROT UR: 0.1 RATIO
CRP SERPL-MCNC: 0.27 MG/DL
DEPRECATED KAPPA LC FREE/LAMBDA SER: 1.34 RATIO
EOSINOPHIL # BLD AUTO: 0.28 K/UL
EOSINOPHIL NFR BLD AUTO: 4.5 %
ERYTHROCYTE [SEDIMENTATION RATE] IN BLOOD BY WESTERGREN METHOD: 46 MM/HR
ESTIMATED AVERAGE GLUCOSE: 108 MG/DL
FERRITIN SERPL-MCNC: 142 NG/ML
FOLATE SERPL-MCNC: 8.1 NG/ML
GAMMA GLOB FLD ELPH-MCNC: 0.9 G/DL
GAMMA GLOB MFR SERPL ELPH: 14.3 %
GLUCOSE QUALITATIVE U: NEGATIVE
GLUCOSE SERPL-MCNC: 88 MG/DL
HBA1C MFR BLD HPLC: 5.4 %
HBV SURFACE AB SER QL: NONREACTIVE
HBV SURFACE AG SER QL: NONREACTIVE
HCT VFR BLD CALC: 45.8 %
HCV AB SER QL: NONREACTIVE
HCV S/CO RATIO: 0.09 S/CO
HCYS SERPL-MCNC: 12.6 UMOL/L
HDLC SERPL-MCNC: 50 MG/DL
HGB BLD-MCNC: 14.3 G/DL
HYALINE CASTS: 1 /LPF
IGA SER QL IEP: 136 MG/DL
IGG SER QL IEP: 1009 MG/DL
IGM SER QL IEP: 95 MG/DL
IMM GRANULOCYTES NFR BLD AUTO: 0.2 %
INR PPP: 0.94 RATIO
INTERPRETATION SERPL IEP-IMP: NORMAL
IRON SATN MFR SERPL: 20 %
IRON SERPL-MCNC: 56 UG/DL
KAPPA LC CSF-MCNC: 0.98 MG/DL
KAPPA LC SERPL-MCNC: 1.31 MG/DL
KETONES URINE: NEGATIVE
LDLC SERPL CALC-MCNC: 110 MG/DL
LEUKOCYTE ESTERASE URINE: ABNORMAL
LYMPHOCYTES # BLD AUTO: 1.88 K/UL
LYMPHOCYTES NFR BLD AUTO: 30.3 %
M PROTEIN SPEC IFE-MCNC: NORMAL
MAGNESIUM SERPL-MCNC: 2.5 MG/DL
MAN DIFF?: NORMAL
MCHC RBC-ENTMCNC: 28.6 PG
MCHC RBC-ENTMCNC: 31.2 GM/DL
MCV RBC AUTO: 91.6 FL
METHYLMALONATE SERPL-SCNC: 835 NMOL/L
MICROSCOPIC-UA: NORMAL
MONOCYTES # BLD AUTO: 0.4 K/UL
MONOCYTES NFR BLD AUTO: 6.4 %
MPO AB + PR3 PNL SER: NORMAL
NEUTROPHILS # BLD AUTO: 3.61 K/UL
NEUTROPHILS NFR BLD AUTO: 58.1 %
NITRITE URINE: NEGATIVE
PARATHYROID HORMONE INTACT: 51 PG/ML
PH URINE: 5.5
PHOSPHATE SERPL-MCNC: 3.4 MG/DL
PLATELET # BLD AUTO: 243 K/UL
POTASSIUM SERPL-SCNC: 4.1 MMOL/L
PROT SERPL-MCNC: 6.6 G/DL
PROT UR-MCNC: 8 MG/DL
PROTEIN URINE: NEGATIVE
PT BLD: 10.8 SEC
RBC # BLD: 5 M/UL
RBC # FLD: 13.9 %
RED BLOOD CELLS URINE: 0 /HPF
RENIN PLASMA: 9.1 PG/ML
RHEUMATOID FACT SER QL: <10 IU/ML
SARS-COV-2 IGG SERPL IA-ACNC: 26.5 AU/ML
SARS-COV-2 IGG SERPL QL IA: POSITIVE
SODIUM SERPL-SCNC: 140 MMOL/L
SPECIFIC GRAVITY URINE: 1.01
SQUAMOUS EPITHELIAL CELLS: 1 /HPF
T3FREE SERPL-MCNC: 2.68 PG/ML
T3RU NFR SERPL: 1.1 TBI
T4 FREE SERPL-MCNC: 1.2 NG/DL
T4 SERPL-MCNC: 8.3 UG/DL
THYROGLOB AB SERPL-ACNC: <20 IU/ML
THYROPEROXIDASE AB SERPL IA-ACNC: 926 IU/ML
TIBC SERPL-MCNC: 284 UG/DL
TRIGL SERPL-MCNC: 125 MG/DL
TSH SERPL-ACNC: 4.12 UIU/ML
UIBC SERPL-MCNC: 227 UG/DL
URATE SERPL-MCNC: 5.9 MG/DL
UROBILINOGEN URINE: NORMAL
VIT B12 SERPL-MCNC: 372 PG/ML
WBC # FLD AUTO: 6.21 K/UL
WHITE BLOOD CELLS URINE: 1 /HPF

## 2020-06-18 RX ORDER — CIPROFLOXACIN HYDROCHLORIDE 250 MG/1
250 TABLET, FILM COATED ORAL
Qty: 6 | Refills: 0 | Status: ACTIVE | COMMUNITY
Start: 2020-06-18 | End: 1900-01-01

## 2020-06-18 NOTE — ADDENDUM
[FreeTextEntry1] : All medical record entries made by the Scribe were at my, Dr. Brock Hamilton, direction and personally dictated by me on 06/10/2020. I have reviewed the chart and agree that the record accurately reflects my personal performance of the history, physical exam, assessment and plan. I have also personally directed, reviewed, and agreed with the chart.\par \par 6/18/2020---- review of data preop:    pt scheduled for 6/23 surgery, and has had clinical evaluation as above.   pt is clinically well, with blood pressure acceptable on current rx and exam unrevealing.  Her ekg shows nonspecific st-t wave changes which are similar to those of past EKG recordings, and at that time her nuclear stress test showed no evidence of ischemia.  her labs are unrevealing except for a borderline LDL cholesterol which may need an adjustment in future rx, and what will be an increase in VIt D rx.   Her aptt is minimally abnormal at 38.5 sec, with nl up to 36.3 sec.   I have notified dr king's office of this modest deviation from normal.   her urine culture grew 100,000 colonies of morganella, sensitive to cipro.   tho pt is asymptomatic, and would generally not be rxed, in order to avoid any potential  variables of concern, i will rx her with three days only of cipro 250 bid for 3 days.   there is nothing in the above evaluation which precludes proceeding with her scheduled surgery if coagulation status is deemed acceptable with above data.  Brock Hamilton MD

## 2020-06-18 NOTE — ASSESSMENT
[FreeTextEntry1] : Plan:\par 1) HTN: Patient is borderline positionally hypotensive upon standing in office today. Noted that there may be a discrepancy between home cuff and cuff in office; will decrease HCTZ 25 mg to MWFS; and otherwise continue current regimen including clonidine patch, which was causing pigmented jie but no irritation.\par 2) Pt experienced dry mouth but she declines treatment with pilocarpine today due to her history of Bell's palsy\par 3) Lower extremity pain: Referred patient to Dr. Reynold Tovar for further evaluation.\par 4) B12  deficiency: Patient to start B12 1000 mcg QD\par 5) HLD: lipid profile on recent labs acceptable other than elevated triglycerides. Continue therapy and reassess on repeat labs.\par 6) EKG displayed nonspecific STT changes; EKG with similar nonspecific STT changes was displayed in previous tracing in 2018.  Stress in 2018 showed normal perfusion, and patient should proceed with surgery as planned assuming no further contraindication to surgery is found on labs.\par \par Changes to Medications: Decrease HCTZ 25mg to MWFS, start B12 1000 mcg QD as above\par \par EKG taken with results as above. Labs were drawn and patient will return in 6-8 weeks for a follow-up appointment.

## 2020-06-18 NOTE — END OF VISIT
[Time Spent: ___ minutes] : I have spent [unfilled] minutes of time on the encounter. [>50% of the face to face encounter time was spent on counseling and/or coordination of care for ___] : Greater than 50% of the face to face encounter time was spent on counseling and/or coordination of care for [unfilled] [FreeTextEntry3] : Documented by Kip Tavares acting as a scribe for Dr. Brock Hamilton on 06/10/2020.

## 2020-06-18 NOTE — HISTORY OF PRESENT ILLNESS
[___ Week(s) Ago] : [unfilled] week(s) ago [Adding Medication ___] : adding [unfilled] [Primary] : primary hypertension [FreeTextEntry1] : The patient is present for treatment of HTN and preoperative clearance for surgery to treat carpal tunnel syndrome affecting her left arm.\par \par The patient states she is doing reasonably well otherwise.  She notes recent home systolic BP readings of 150.  She reports she has had COVID infection in the past.  The patient also reports recent pain affecting her hip, with associated difficulty walking and sitting.  She states she underwent a pelvic MRI and presented with results in office today, and also reports pain associated with her knees while walking.  She reports recent weight gain and poor control of her diet, as well as dry mouth due to clonidine patch use.  The patient weighs 181 lbs in office today, an increase in 4 lbs over 9 months.\par \par The patient underwent cardiac stress testing May 2018, with nonspecific STT changes but otherwise normal findings.\par \par Current medications: diclofenac 75mg prn, metoprolol succinate 50mg BID, Vit D 1000units QD. Levothyroxine 50mcg QD, amlodipine 5mg BID, clonidine #2 patch, HCTZ 25mg QD

## 2020-09-13 ENCOUNTER — RX RENEWAL (OUTPATIENT)
Age: 70
End: 2020-09-13

## 2020-09-13 RX ORDER — CLONIDINE 0.2 MG/24H
0.2 PATCH, EXTENDED RELEASE TRANSDERMAL
Qty: 12 | Refills: 3 | Status: ACTIVE | COMMUNITY
Start: 2020-04-26 | End: 1900-01-01

## 2020-09-14 RX ORDER — GUANFACINE 2 MG/1
2 TABLET ORAL
Qty: 30 | Refills: 3 | Status: ACTIVE | COMMUNITY
Start: 2020-09-14 | End: 1900-01-01

## 2021-02-28 RX ORDER — METOPROLOL SUCCINATE 50 MG/1
50 TABLET, EXTENDED RELEASE ORAL
Qty: 180 | Refills: 3 | Status: ACTIVE | COMMUNITY
Start: 2019-08-21 | End: 1900-01-01

## 2021-03-02 ENCOUNTER — APPOINTMENT (OUTPATIENT)
Dept: NEPHROLOGY | Facility: CLINIC | Age: 71
End: 2021-03-02
Payer: MEDICARE

## 2021-03-02 PROCEDURE — 99443: CPT | Mod: 95

## 2021-03-02 NOTE — HISTORY OF PRESENT ILLNESS
[Home] : at home, [unfilled] , at the time of the visit. [Other Location: e.g. Home (Enter Location, City,State)___] : at [unfilled] [Verbal consent obtained from patient] : the patient, [unfilled] [FreeTextEntry1] : The patient is present for treatment of HTN and preoperative clearance for surgery to treat carpal tunnel syndrome affecting her left arm.\par \par The patient reports that her   this past January and while she has been grieving she also has a very supportive and loving family surrounding her. \par She notes that her BP is running on average between 150-170 systolic.\par No longer taking Tenex due to dry mouth. In the past she did not tolerate the clonidine patch which resulted in a rash.\par \par Labs from 20 reveal: BUN 29, creatinine 0.85, eGFR 69, , Vit D 25 of 20.5, HGB 14.3, HCT 45.8, sed rate 46, COVID-19 IgG positive, TSH 4.12.\par \par Current medications: diclofenac 75mg prn, metoprolol succinate 50mg BID, Vit D 1000units QD, Levothyroxine 50mcg QD, amlodipine 5mg BID, clonidine #2 patch, HCTZ 25mg QD, B12 1000mcg QD.

## 2021-03-02 NOTE — END OF VISIT
[Time Spent: ___ minutes] : I have spent [unfilled] minutes of time on the encounter. [FreeTextEntry3] : All medical record entries made by the Scribe were at my, Dr. Brock Hamilton, direction and personally dictated by me on 03/02/2021. I have reviewed the chart and agree that the record accurately reflects my personal performance of the history, physical exam, assessment and plan. I have also personally directed, reviewed, and agreed with the chart.

## 2021-03-02 NOTE — ADDENDUM
[FreeTextEntry1] :  Documented by Asif Bowman acting as a scribe for Dr. Brock Hamilton on 03/02/2021.

## 2021-03-02 NOTE — ASSESSMENT
[FreeTextEntry1] : Plan:\par 1) HTN: Patient's BP per her home report is often in a hypertensive range between 150-170 systolic. Patient is not interested in pilocarpine for dry mouth, so she will continue off of Tenex as this was contributing to her dry mouth. Now patient will start telmisartan 20mg QD. If pressure does not improve then we will likely increase the dose; if pressure does improve then we will recheck labs. Patient's son works for volunteer [employer] service and he will check her pressure daily on her new regimen. Return in one week via telemedicine to reassess pressure and treatment.\par 2) CRI: last eGFR of 69 is stable for patient. Continue to monitor on repeat CMP. \par 3) HLD: Last lipid profile was stable for patient. Continue on current regimen. \par 4) Hypothyroidism: last thyroid profile was stable. Continue on current regimen. \par 5) COVID-19 Precautions: Advised patient to continue covid precautions throughout the duration of the covid pandemic. COVID-19 vaccination: Counseled patient on the vaccines for CoVid and recommended she obtain it when available. s\par \par \par Medication changes: start telmisartan 20mg QD.\par \par Plan to reconvene with patient in one week via telemedicine.

## 2021-08-17 RX ORDER — TELMISARTAN 20 MG/1
20 TABLET ORAL
Qty: 30 | Refills: 3 | Status: ACTIVE | COMMUNITY
Start: 2021-03-02 | End: 1900-01-01

## 2021-08-17 RX ORDER — LEVOTHYROXINE SODIUM 0.05 MG/1
50 TABLET ORAL
Qty: 90 | Refills: 2 | Status: ACTIVE | COMMUNITY
Start: 2021-08-17 | End: 1900-01-01

## 2021-08-17 RX ORDER — LEVOTHYROXINE SODIUM 0.05 MG/1
50 TABLET ORAL
Qty: 30 | Refills: 3 | Status: ACTIVE | COMMUNITY
Start: 2019-08-26

## 2021-08-26 ENCOUNTER — LABORATORY RESULT (OUTPATIENT)
Age: 71
End: 2021-08-26

## 2021-08-26 ENCOUNTER — APPOINTMENT (OUTPATIENT)
Dept: NEPHROLOGY | Facility: CLINIC | Age: 71
End: 2021-08-26
Payer: MEDICARE

## 2021-08-26 VITALS — HEART RATE: 74 BPM | OXYGEN SATURATION: 98 % | BODY MASS INDEX: 35.15 KG/M2 | TEMPERATURE: 98.2 F | WEIGHT: 180 LBS

## 2021-08-26 VITALS — DIASTOLIC BLOOD PRESSURE: 76 MMHG | SYSTOLIC BLOOD PRESSURE: 131 MMHG

## 2021-08-26 VITALS — HEART RATE: 72 BPM | DIASTOLIC BLOOD PRESSURE: 85 MMHG | SYSTOLIC BLOOD PRESSURE: 141 MMHG

## 2021-08-26 VITALS — HEART RATE: 83 BPM | SYSTOLIC BLOOD PRESSURE: 141 MMHG | DIASTOLIC BLOOD PRESSURE: 88 MMHG

## 2021-08-26 VITALS — DIASTOLIC BLOOD PRESSURE: 80 MMHG | HEART RATE: 72 BPM | SYSTOLIC BLOOD PRESSURE: 145 MMHG

## 2021-08-26 DIAGNOSIS — R82.71 BACTERIURIA: ICD-10-CM

## 2021-08-26 DIAGNOSIS — R06.00 DYSPNEA, UNSPECIFIED: ICD-10-CM

## 2021-08-26 DIAGNOSIS — I10 ESSENTIAL (PRIMARY) HYPERTENSION: ICD-10-CM

## 2021-08-26 DIAGNOSIS — F43.21 ADJUSTMENT DISORDER WITH DEPRESSED MOOD: ICD-10-CM

## 2021-08-26 DIAGNOSIS — R53.83 OTHER FATIGUE: ICD-10-CM

## 2021-08-26 DIAGNOSIS — R79.89 OTHER SPECIFIED ABNORMAL FINDINGS OF BLOOD CHEMISTRY: ICD-10-CM

## 2021-08-26 DIAGNOSIS — E03.9 HYPOTHYROIDISM, UNSPECIFIED: ICD-10-CM

## 2021-08-26 PROCEDURE — 99215 OFFICE O/P EST HI 40 MIN: CPT | Mod: 25

## 2021-08-26 PROCEDURE — 93000 ELECTROCARDIOGRAM COMPLETE: CPT

## 2021-08-26 PROCEDURE — 36415 COLL VENOUS BLD VENIPUNCTURE: CPT

## 2021-08-26 NOTE — PHYSICAL EXAM
[General Appearance - Alert] : alert [General Appearance - In No Acute Distress] : in no acute distress [Sclera] : the sclera and conjunctiva were normal [PERRL With Normal Accommodation] : pupils were equal in size, round, and reactive to light [Extraocular Movements] : extraocular movements were intact [Outer Ear] : the ears and nose were normal in appearance [Hearing Threshold Finger Rub Not [place]] : hearing was normal [Examination Of The Oral Cavity] : the lips and gums were normal [Neck Appearance] : the appearance of the neck was normal [Neck Cervical Mass (___cm)] : no neck mass was observed [Jugular Venous Distention Increased] : there was no jugular-venous distention [Thyroid Diffuse Enlargement] : the thyroid was not enlarged [Thyroid Nodule] : there were no palpable thyroid nodules [Auscultation Breath Sounds / Voice Sounds] : lungs were clear to auscultation bilaterally [Heart Rate And Rhythm] : heart rate was normal and rhythm regular [Heart Sounds] : normal S1 and S2 [Heart Sounds Gallop] : no gallops [Murmurs] : no murmurs [Heart Sounds Pericardial Friction Rub] : no pericardial rub [Edema] : there was no peripheral edema [Bowel Sounds] : normal bowel sounds [Abdomen Soft] : soft [Abdomen Tenderness] : non-tender [Abdomen Mass (___ Cm)] : no abdominal mass palpated [No CVA Tenderness] : no ~M costovertebral angle tenderness [No Spinal Tenderness] : no spinal tenderness [Abnormal Walk] : normal gait [Involuntary Movements] : no involuntary movements were seen [Musculoskeletal - Swelling] : no joint swelling seen [Motor Tone] : muscle strength and tone were normal [Skin Color & Pigmentation] : normal skin color and pigmentation [Skin Turgor] : normal skin turgor [] : no rash [No Focal Deficits] : no focal deficits [Oriented To Time, Place, And Person] : oriented to person, place, and time [Impaired Insight] : insight and judgment were intact [Affect] : the affect was normal

## 2021-08-27 LAB
24R-OH-CALCIDIOL SERPL-MCNC: 42 PG/ML
25(OH)D3 SERPL-MCNC: 17.1 NG/ML
ALBUMIN SERPL ELPH-MCNC: 4.5 G/DL
ALDOSTERONE SERUM: 11.6 NG/DL
ALP BLD-CCNC: 96 U/L
ALT SERPL-CCNC: 16 U/L
ANION GAP SERPL CALC-SCNC: 13 MMOL/L
APPEARANCE: CLEAR
AST SERPL-CCNC: 16 U/L
BACTERIA: ABNORMAL
BASOPHILS # BLD AUTO: 0.03 K/UL
BASOPHILS NFR BLD AUTO: 0.5 %
BILIRUB SERPL-MCNC: 0.5 MG/DL
BILIRUBIN URINE: NEGATIVE
BLOOD URINE: NEGATIVE
BUN SERPL-MCNC: 25 MG/DL
C3 SERPL-MCNC: 112 MG/DL
C4 SERPL-MCNC: 33 MG/DL
CALCIUM SERPL-MCNC: 9.3 MG/DL
CALCIUM SERPL-MCNC: 9.3 MG/DL
CHLORIDE SERPL-SCNC: 105 MMOL/L
CHOLEST SERPL-MCNC: 182 MG/DL
CO2 SERPL-SCNC: 24 MMOL/L
COLOR: YELLOW
CREAT SERPL-MCNC: 0.92 MG/DL
CRP SERPL-MCNC: <3 MG/L
EOSINOPHIL # BLD AUTO: 0.22 K/UL
EOSINOPHIL NFR BLD AUTO: 3.7 %
ESTIMATED AVERAGE GLUCOSE: 111 MG/DL
FERRITIN SERPL-MCNC: 103 NG/ML
FOLATE SERPL-MCNC: 10.7 NG/ML
GLUCOSE QUALITATIVE U: NEGATIVE
GLUCOSE SERPL-MCNC: 96 MG/DL
HBA1C MFR BLD HPLC: 5.5 %
HBV SURFACE AB SER QL: NONREACTIVE
HBV SURFACE AG SER QL: NONREACTIVE
HCT VFR BLD CALC: 43.9 %
HCV AB SER QL: NONREACTIVE
HCV S/CO RATIO: 0.08 S/CO
HCYS SERPL-MCNC: 11.1 UMOL/L
HDLC SERPL-MCNC: 49 MG/DL
HGB BLD-MCNC: 13.8 G/DL
HYALINE CASTS: 1 /LPF
IMM GRANULOCYTES NFR BLD AUTO: 0.2 %
IRON SATN MFR SERPL: 32 %
IRON SERPL-MCNC: 95 UG/DL
KETONES URINE: NEGATIVE
LDLC SERPL CALC-MCNC: 110 MG/DL
LEUKOCYTE ESTERASE URINE: ABNORMAL
LYMPHOCYTES # BLD AUTO: 1.93 K/UL
LYMPHOCYTES NFR BLD AUTO: 32.8 %
MAGNESIUM SERPL-MCNC: 2.2 MG/DL
MAN DIFF?: NORMAL
MCHC RBC-ENTMCNC: 29.2 PG
MCHC RBC-ENTMCNC: 31.4 GM/DL
MCV RBC AUTO: 92.8 FL
MICROSCOPIC-UA: NORMAL
MONOCYTES # BLD AUTO: 0.37 K/UL
MONOCYTES NFR BLD AUTO: 6.3 %
NEUTROPHILS # BLD AUTO: 3.33 K/UL
NEUTROPHILS NFR BLD AUTO: 56.5 %
NITRITE URINE: POSITIVE
NONHDLC SERPL-MCNC: 133 MG/DL
PARATHYROID HORMONE INTACT: 58 PG/ML
PH URINE: 5.5
PHOSPHATE SERPL-MCNC: 3.6 MG/DL
PLATELET # BLD AUTO: 254 K/UL
POTASSIUM SERPL-SCNC: 4.3 MMOL/L
PROT SERPL-MCNC: 6.9 G/DL
PROTEIN URINE: NEGATIVE
RBC # BLD: 4.73 M/UL
RBC # FLD: 13.8 %
RED BLOOD CELLS URINE: 1 /HPF
RENIN PLASMA: <2.1 PG/ML
RHEUMATOID FACT SER QL: <10 IU/ML
SODIUM SERPL-SCNC: 143 MMOL/L
SPECIFIC GRAVITY URINE: 1.02
SQUAMOUS EPITHELIAL CELLS: 1 /HPF
T3FREE SERPL-MCNC: 2.76 PG/ML
T3RU NFR SERPL: 1.2 TBI
T4 FREE SERPL-MCNC: 1.1 NG/DL
T4 SERPL-MCNC: 7.8 UG/DL
THYROGLOB AB SERPL-ACNC: <20 IU/ML
THYROPEROXIDASE AB SERPL IA-ACNC: 1356 IU/ML
TIBC SERPL-MCNC: 295 UG/DL
TRIGL SERPL-MCNC: 116 MG/DL
TSH SERPL-ACNC: 5.64 UIU/ML
UIBC SERPL-MCNC: 200 UG/DL
URATE SERPL-MCNC: 5.5 MG/DL
UROBILINOGEN URINE: NORMAL
VIT B12 SERPL-MCNC: 421 PG/ML
WBC # FLD AUTO: 5.89 K/UL
WHITE BLOOD CELLS URINE: 10 /HPF

## 2021-08-27 NOTE — END OF VISIT
[Time Spent: ___ minutes] : I have spent [unfilled] minutes of time on the encounter. [FreeTextEntry3] : Documented by Kip Tavares acting as a scribe for Dr. Brock Hamilton on 08/26/2021.

## 2021-08-27 NOTE — ADDENDUM
[FreeTextEntry1] : All medical record entries made by the Scribe were at my, Dr. Brock Hamilton, direction and personally dictated by me on 08/26/2021. I have reviewed the chart and agree that the record accurately reflects my personal performance of the history, physical exam, assessment and plan. I have also personally directed, reviewed, and agreed with the chart.

## 2021-08-27 NOTE — ASSESSMENT
[FreeTextEntry1] : Plan:\par 1) HTN: BP is borderline elevated in office today. Will not alter current regimen at this time but will reassess pressure and regimen at next evaluation. instructed to keep home BP log twice daily and bring home BP cuff to next visit for validation.\par 2) EKG taken today reveals NSR, minimal voltage criteria for LVH, cannot r/o anterior infarct of undetermined age.\par 3) CRI: last creatinine of 0.9 is stable for patient. Continue to monitor on repeat CMP today. \par 4) Hypothyroidism: last thyroid profile was stable, though patient has since d/c'd levothyroxine. Will recheck on labs today.\par 5) HLD: Last LDL of 120 is elevated. Will recheck on labs today, and advised patient further pending labs.\par 6) Advised patient to receive a COVID vaccine as soon as possible, especially in view of current COVID variant rates, excepting if she has a history of severe allergy.\par 7) Back pain: Referred patient to Dr. Reynold Tovar for physiatry evaluation. Instructed patient to bring disc records of past imaging to this consultation.\par 8) Urinary incontinence: Referred patient to Dr. Lebron for urology evaluation.\par 9) B12 deficiency: Advised patient to restart vitamin B12 supplement.\par 10) Vit D deficiency: Advised patient to restart vitamin D supplement.\par 11) If constipation persists, will recommend colorectal consultation.\par \par Changes to medications: restart vitamin B, restart vitamin D\par \par Labs were drawn and patient will return in 1 month for a follow-up appointment.\par

## 2021-08-27 NOTE — HISTORY OF PRESENT ILLNESS
[FreeTextEntry1] : The patient is a 71 year old female presenting today for follow-up evaluation of HTN. Last office visit June 2020.\par \par The patient has stopped all medications excepting metoprolol and amlodipine on her own several months ago. She reportedly had normal BP at colonoscopy 2 months ago. She states her home BP is generally in normal range.\par - She also c/o urinary incontinence. She has had 7 full-term vaginal deliveries.\par - In addition she c/o severe back pain when she moves her bowels. She has been seen by a chiropractor with reported moderate improvement of pain.\par - The patient reports a h/o COVID infection, though has not yet received a COVID vaccine.\par \par Labs 7/13/21: , proBNP 58, creatinine 0.9\par \par Current medications: metoprolol succinate 50mg QD, amlodipine 5mg QD\par - Patient no longer taking clonidine #2 patch, HCTZ 25mg QD, telmisartan 20mg QD, levothyroxine 50mcg QD, Vit D 1000 units QD, B12 1000mcg QD\par

## 2021-08-29 LAB — ERYTHROCYTE [SEDIMENTATION RATE] IN BLOOD BY WESTERGREN METHOD: 15 MM/HR

## 2021-09-03 LAB
ALBUMIN MFR SERPL ELPH: 62.4 %
ALBUMIN SERPL-MCNC: 4.3 G/DL
ALBUMIN/GLOB SERPL: 1.7 RATIO
ALP BONE SERPL-MCNC: 17.4 UG/L
ALPHA1 GLOB MFR SERPL ELPH: 3.5 %
ALPHA1 GLOB SERPL ELPH-MCNC: 0.2 G/DL
ALPHA2 GLOB MFR SERPL ELPH: 9.9 %
ALPHA2 GLOB SERPL ELPH-MCNC: 0.7 G/DL
ANA SER IF-ACNC: NEGATIVE
B-GLOBULIN MFR SERPL ELPH: 10.1 %
B-GLOBULIN SERPL ELPH-MCNC: 0.7 G/DL
COLLAGEN CTX SERPL-MCNC: 241 PG/ML
DEPRECATED KAPPA LC FREE/LAMBDA SER: 0.97 RATIO
GAMMA GLOB FLD ELPH-MCNC: 1 G/DL
GAMMA GLOB MFR SERPL ELPH: 14.1 %
IGA SER QL IEP: 137 MG/DL
IGG SER QL IEP: 1017 MG/DL
IGM SER QL IEP: 68 MG/DL
INTERPRETATION SERPL IEP-IMP: NORMAL
KAPPA LC CSF-MCNC: 1.15 MG/DL
KAPPA LC SERPL-MCNC: 1.12 MG/DL
M PROTEIN SPEC IFE-MCNC: NORMAL
METHYLMALONATE SERPL-SCNC: 173 NMOL/L
MPO AB + PR3 PNL SER: NORMAL
PROT SERPL-MCNC: 6.9 G/DL
PROT SERPL-MCNC: 6.9 G/DL

## 2021-09-10 RX ORDER — AMLODIPINE BESYLATE 5 MG/1
5 TABLET ORAL
Qty: 60 | Refills: 0 | Status: ACTIVE | COMMUNITY
Start: 2020-04-26 | End: 1900-01-01

## 2021-11-10 ENCOUNTER — APPOINTMENT (OUTPATIENT)
Dept: UROLOGY | Facility: CLINIC | Age: 71
End: 2021-11-10
Payer: MEDICARE

## 2021-11-10 VITALS
DIASTOLIC BLOOD PRESSURE: 85 MMHG | HEART RATE: 67 BPM | SYSTOLIC BLOOD PRESSURE: 133 MMHG | OXYGEN SATURATION: 97 % | WEIGHT: 178 LBS | HEIGHT: 60 IN | BODY MASS INDEX: 34.95 KG/M2 | TEMPERATURE: 98 F

## 2021-11-10 DIAGNOSIS — N39.3 STRESS INCONTINENCE (FEMALE) (MALE): ICD-10-CM

## 2021-11-10 DIAGNOSIS — N39.41 URGE INCONTINENCE: ICD-10-CM

## 2021-11-10 DIAGNOSIS — R39.15 URGENCY OF URINATION: ICD-10-CM

## 2021-11-10 PROCEDURE — 99204 OFFICE O/P NEW MOD 45 MIN: CPT

## 2021-11-10 NOTE — HISTORY OF PRESENT ILLNESS
[FreeTextEntry1] : 71 year old female referred by Dr. Hamilton presents to the office with c/o of urinary incontinence. Patient doesn't feel the urge to go at times and she is constantly wet. Patient reports having a vaginal itchiness for which she takes vitamins for.  DIAMOND and UUI equally bothersome for patient. \par \par PVR 3 ml\par \par Force of stream: sometimes strong sometimes weak \par Hesitancy: no\par intermittency: no\par Dribbling: yes\par Daytime frequency: more than 10x\par Nighttime frequency: 1-2x early morning\par Dysuria: no\par Urgency: yes\par UUI: yes\par DIAMOND: yes\par Pad usage: patient changes underwear at least 10x \par Straining to void: sometimes\par Incomplete bladder emptying: yes\par UTI: when younger, last one "very long time ago"\par Hematuria: no\par Stone disease: no\par STD: no\par Bowel Issue: constipation (does take vitamins and has daily BM)\par Fluid intake:5-6 cups of water (7 oz); 3-4 cups of coffee a day\par Pregnancies:  (vaginal)\par Prolapse sensation: no\par \par

## 2021-11-10 NOTE — ASSESSMENT
[FreeTextEntry1] : \par 71 year-old female with mixed urinary incontinence. \par \par PVR 3 ml\par \par 1. Urine c+s: r/o UTI.\par 2. F/u UDS: asses bladder function. \par

## 2021-11-10 NOTE — REVIEW OF SYSTEMS
Per Dr. Jaleesa Amezquita: Possible stroke seen on MRI or some structural damage but it is on both sides of the brain so would not explain the right sided weakness. Please refer to Dr. Dinah Macedo for further eval. Thanks     Discussed Dr. Kami Patel recommendations with patient and transferred to [facility] to schedule with Dr. Dinah Macedo. Patient would also like results of her MRI of C-spine. This was ordered by neurosurgery, so request has been routed to Dr. Marisabel Mcguire office to follow-up. [see HPI] : see HPI [Negative] : Integumentary

## 2021-11-10 NOTE — PHYSICAL EXAM
[General Appearance - Well Developed] : well developed [General Appearance - Well Nourished] : well nourished [Normal Appearance] : normal appearance [Well Groomed] : well groomed [General Appearance - In No Acute Distress] : no acute distress [Edema] : no peripheral edema [Respiration, Rhythm And Depth] : normal respiratory rhythm and effort [Exaggerated Use Of Accessory Muscles For Inspiration] : no accessory muscle use [Urinary Bladder Findings] : the bladder was normal on palpation [External Female Genitalia] : normal external genitalia [FreeTextEntry1] : - CST, -UH, -Prolapse.  [Normal Station and Gait] : the gait and station were normal for the patient's age [] : no rash [Oriented To Time, Place, And Person] : oriented to person, place, and time [Affect] : the affect was normal [Mood] : the mood was normal [Not Anxious] : not anxious

## 2021-11-15 DIAGNOSIS — N39.0 URINARY TRACT INFECTION, SITE NOT SPECIFIED: ICD-10-CM

## 2021-11-15 LAB — BACTERIA UR CULT: ABNORMAL

## 2021-11-29 ENCOUNTER — NON-APPOINTMENT (OUTPATIENT)
Age: 71
End: 2021-11-29

## 2021-12-08 RX ORDER — SULFAMETHOXAZOLE AND TRIMETHOPRIM 800; 160 MG/1; MG/1
800-160 TABLET ORAL
Qty: 6 | Refills: 0 | Status: ACTIVE | COMMUNITY
Start: 2021-11-15 | End: 1900-01-01

## 2021-12-09 ENCOUNTER — APPOINTMENT (OUTPATIENT)
Dept: UROLOGY | Facility: CLINIC | Age: 71
End: 2021-12-09

## 2024-10-30 NOTE — PHYSICAL EXAM
Date of Service: 10/30/2024  Patient: Rodri Whitfield  MRN: [id number]  Referring Provider: No ref. provider found  Primary Care Physician: No Assigned PCP Generic Provider, MD     History of Present Illness:    Information were mainly retrieved by reviewing medical records, discussing with nursing home medical staff as patient is unable to explain his condition.    This is a 79-year-old gentleman with a past medical history known for hypertension hyperlipidemia, atrial fibrillation on warfarin remote left MCA CVA with residual aphasia , residual right-sided weakness, vascular dementia without behavioral disturbances, hypertensive heart disease, benign prostatic hyperplasia who was referred to my office for further evaluation of imbalance and frequent falls.      Reviewing medical records from [facility] emergency room on October 7, 2024:  77 year old Male with PMH HTN,  HLD, A-fib (on warfarin), CVA with residual speech difficulty and  right sided weakness, s/p craniotomy inand BPH who was admitted to [facility] after a fall.  HPI is limited due to patient's baseline speech difficulties.    The patient lives in a nursing home comes in with mechanical fall.  It was witnessed.  He fell off his wheelchair.  Sustained a laceration to his scalp.  No LOC.  No change in mental status.  No fever chills nausea vomit diarrhea cough congestion incontinence seizures syncope onus syncope no hematemesis melena hematochezia no hemoptysis no recent travel hospitalization or antibiotic use.    He had the following workup done:  CT scan of the cervical spine which revealed no acute cervical vertebral displacement or acute displaced fracture.  Multilevel productive/degenerative changes with spondylolisthesis and  central canal narrowing similar to 05/31/2024.    CT scan of the head which revealed  No acute intracranial hemorrhage or mass-effect.      Left-sided craniotomy and left MCA distribution remote infarct similar to  05/31/2024.  Patient again noted be status post left frontal craniotomy with surgical clip in the left sellar region. Large area of low-density  encephalomalacia in the left temporoparietal region with porencephalic dilatation of the left lateral ventricle similar to the  previous exam. There is generalized atrophy with enlargement of the extra-axial spaces and cerebral sulci similar to the prior exam. No  acute intracranial hemorrhage or mass-effect. No midline shift. No extra-axial fluid collection.     According to the medical staff office the patient has increased imbalance causing frequent falls for the last few years with recent worsening.  He has been more wheelchair-bound lately.  He had aphasia with vascular dementia at the baseline there is no known history of alcoholism, diabetes mellitus or substance abuse.    The impression and plan was:   Imbalance, dizziness and fall.   ddx TIA vs BP related vs cardiac vs old stroke symptoms.          MRA Head without Contrast [Feb 25 2023    Stable postsurgical changes of left-sided craniotomies with subjacent trace chronic postsurgical extra-axial collection and left anterior  temporal lobe and frontal lobe and insula gliosis with gyral susceptibility artifact consistent with sequela of chronic  hemorrhage. No evidence of acute hemorrhage. Mild chronic microvascular ischemic changes and cerebral volume loss.  No evidence of acute infarct, intracranial mass effect or midline shift.     Susceptibility artifact from the left paraclinoid aneurysm clip limits evaluation of the adjacent distal left internal carotid artery  without gross abnormality proximally. There is decreased flow enhancement of the left MCA, which may be due to previous infarction  and gliosis.     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 There is 5 mm fullness of the basilar tip  which may represent an infundibulum, however possibility of fusiform  aneurysm is not excluded. Comparison with prior imaging and continued  clinical follow-up is recommended.          Social history:,   He is a nursing home resident used to  smoke  2 packs of cigarettes per day.       Family history: CVA              I have personally reviewed the patient's lab work and results for the last year:  Admission on 05/31/2024, Discharged on 06/01/2024   Component Date Value Ref Range Status    WBC 05/31/2024 8.6  4.4 - 11.3 x10*3/uL Final    nRBC 05/31/2024 0.0  0.0 - 0.0 /100 WBCs Final    RBC 05/31/2024 4.56  4.50 - 5.90 x10*6/uL Final    Hemoglobin 05/31/2024 12.8 (L)  13.5 - 17.5 g/dL Final    Hematocrit 05/31/2024 41.6  41.0 - 52.0 % Final    MCV 05/31/2024 91  80 - 100 fL Final    MCH 05/31/2024 28.1  26.0 - 34.0 pg Final    MCHC 05/31/2024 30.8 (L)  32.0 - 36.0 g/dL Final    RDW 05/31/2024 15.3 (H)  11.5 - 14.5 % Final    Platelets 05/31/2024 252  150 - 450 x10*3/uL Final    Neutrophils % 05/31/2024 83.5  40.0 - 80.0 % Final    Immature Granulocytes %, Automated 05/31/2024 0.5  0.0 - 0.9 % Final    Immature Granulocyte Count (IG) includes promyelocytes, myelocytes and metamyelocytes but does not include bands. Percent differential counts (%) should be interpreted in the context of the absolute cell counts (cells/UL).    Lymphocytes % 05/31/2024 9.6  13.0 - 44.0 % Final    Monocytes % 05/31/2024 5.1  2.0 - 10.0 % Final    Eosinophils % 05/31/2024 0.8  0.0 - 6.0 % Final    Basophils % 05/31/2024 0.5  0.0 - 2.0 % Final    Neutrophils Absolute 05/31/2024 7.20 (H)  1.60 - 5.50 x10*3/uL Final    Percent differential counts (%) should be interpreted in the context of the absolute cell counts (cells/uL).    Immature Granulocytes Absolute, Au* 05/31/2024 0.04  0.00 - 0.50 x10*3/uL Final    Lymphocytes Absolute 05/31/2024 0.83  0.80 - 3.00 x10*3/uL Final    Monocytes Absolute 05/31/2024 0.44  0.05 - 0.80 x10*3/uL Final    Eosinophils Absolute 05/31/2024 0.07  0.00 - 0.40 x10*3/uL Final     Basophils Absolute 05/31/2024 0.04  0.00 - 0.10 x10*3/uL Final    Glucose 05/31/2024 97  74 - 99 mg/dL Final    Sodium 05/31/2024 139  136 - 145 mmol/L Final    Potassium 05/31/2024 5.3  3.5 - 5.3 mmol/L Final    Chloride 05/31/2024 106  98 - 107 mmol/L Final    Bicarbonate 05/31/2024 26  21 - 32 mmol/L Final    Anion Gap 05/31/2024 12  10 - 20 mmol/L Final    Urea Nitrogen 05/31/2024 35 (H)  6 - 23 mg/dL Final    Creatinine 05/31/2024 1.53 (H)  0.50 - 1.30 mg/dL Final    eGFR 05/31/2024 46 (L)  >60 mL/min/1.73m*2 Final    Calculations of estimated GFR are performed using the 2021 CKD-EPI Study Refit equation without the race variable for the IDMS-Traceable creatinine methods.  https://jasn.asnjournals.org/content/early/2021/09/22/ASN.6944143711    Calcium 05/31/2024 8.6  8.6 - 10.3 mg/dL Final    Albumin 05/31/2024 3.5  3.4 - 5.0 g/dL Final    Alkaline Phosphatase 05/31/2024 102  33 - 136 U/L Final    Total Protein 05/31/2024 6.6  6.4 - 8.2 g/dL Final    AST 05/31/2024 14  9 - 39 U/L Final    Bilirubin, Total 05/31/2024 0.4  0.0 - 1.2 mg/dL Final    ALT 05/31/2024 12  10 - 52 U/L Final    Patients treated with Sulfasalazine may generate falsely decreased results for ALT.    ABO TYPE 05/31/2024 AB   Final    Rh TYPE 05/31/2024 POS   Final    ANTIBODY SCREEN 05/31/2024 NEG   Final    Protime 05/31/2024 11.6  9.8 - 12.8 seconds Final    INR 05/31/2024 1.0  0.9 - 1.1 Final    aPTT 05/31/2024 30  27 - 38 seconds Final   Admission on 01/26/2024, Discharged on 01/27/2024   Component Date Value Ref Range Status    WBC 01/26/2024 8.6  4.4 - 11.3 x10*3/uL Final    nRBC 01/26/2024 0.0  0.0 - 0.0 /100 WBCs Final    RBC 01/26/2024 4.41 (L)  4.50 - 5.90 x10*6/uL Final    Hemoglobin 01/26/2024 13.2 (L)  13.5 - 17.5 g/dL Final    Hematocrit 01/26/2024 41.9  41.0 - 52.0 % Final    MCV 01/26/2024 95  80 - 100 fL Final    MCH 01/26/2024 29.9  26.0 - 34.0 pg Final    MCHC 01/26/2024 31.5 (L)  32.0 - 36.0 g/dL Final    RDW 01/26/2024  14.3  11.5 - 14.5 % Final    Platelets 01/26/2024 255  150 - 450 x10*3/uL Final    Neutrophils % 01/26/2024 76.4  40.0 - 80.0 % Final    Immature Granulocytes %, Automated 01/26/2024 0.5  0.0 - 0.9 % Final    Immature Granulocyte Count (IG) includes promyelocytes, myelocytes and metamyelocytes but does not include bands. Percent differential counts (%) should be interpreted in the context of the absolute cell counts (cells/UL).    Lymphocytes % 01/26/2024 15.2  13.0 - 44.0 % Final    Monocytes % 01/26/2024 5.4  2.0 - 10.0 % Final    Eosinophils % 01/26/2024 2.0  0.0 - 6.0 % Final    Basophils % 01/26/2024 0.5  0.0 - 2.0 % Final    Neutrophils Absolute 01/26/2024 6.61 (H)  1.60 - 5.50 x10*3/uL Final    Percent differential counts (%) should be interpreted in the context of the absolute cell counts (cells/uL).    Immature Granulocytes Absolute, Au* 01/26/2024 0.04  0.00 - 0.50 x10*3/uL Final    Lymphocytes Absolute 01/26/2024 1.31  0.80 - 3.00 x10*3/uL Final    Monocytes Absolute 01/26/2024 0.47  0.05 - 0.80 x10*3/uL Final    Eosinophils Absolute 01/26/2024 0.17  0.00 - 0.40 x10*3/uL Final    Basophils Absolute 01/26/2024 0.04  0.00 - 0.10 x10*3/uL Final    Glucose 01/26/2024 93  74 - 99 mg/dL Final    Sodium 01/26/2024 140  136 - 145 mmol/L Final    Potassium 01/26/2024 5.0  3.5 - 5.3 mmol/L Final    Chloride 01/26/2024 106  98 - 107 mmol/L Final    Bicarbonate 01/26/2024 27  21 - 32 mmol/L Final    Anion Gap 01/26/2024 12  10 - 20 mmol/L Final    Urea Nitrogen 01/26/2024 22  6 - 23 mg/dL Final    Creatinine 01/26/2024 1.10  0.50 - 1.30 mg/dL Final    eGFR 01/26/2024 69  >60 mL/min/1.73m*2 Final    Calculations of estimated GFR are performed using the 2021 CKD-EPI Study Refit equation without the race variable for the IDMS-Traceable creatinine methods.  https://jasn.asnjournals.org/content/early/2021/09/22/ASN.7718193314    Calcium 01/26/2024 9.0  8.6 - 10.3 mg/dL Final    Albumin 01/26/2024 3.6  3.4 - 5.0 g/dL Final  [General Appearance - Alert] : alert    Alkaline Phosphatase 01/26/2024 98  33 - 136 U/L Final    Total Protein 01/26/2024 6.7  6.4 - 8.2 g/dL Final    AST 01/26/2024 15  9 - 39 U/L Final    Bilirubin, Total 01/26/2024 0.5  0.0 - 1.2 mg/dL Final    ALT 01/26/2024 8 (L)  10 - 52 U/L Final    Patients treated with Sulfasalazine may generate falsely decreased results for ALT.    Magnesium 01/26/2024 1.80  1.60 - 2.40 mg/dL Final    Troponin I, High Sensitivity 01/26/2024 12  0 - 20 ng/L Final    Color, Urine 01/26/2024 Yellow  Straw, Yellow Final    Appearance, Urine 01/26/2024 Cloudy (N)  Clear Final    Specific Gravity, Urine 01/26/2024 1.013  1.005 - 1.035 Final    pH, Urine 01/26/2024 8.0  5.0, 5.5, 6.0, 6.5, 7.0, 7.5, 8.0 Final    Protein, Urine 01/26/2024 NEGATIVE  NEGATIVE mg/dL Final    Glucose, Urine 01/26/2024 NEGATIVE  NEGATIVE mg/dL Final    Blood, Urine 01/26/2024 NEGATIVE  NEGATIVE Final    Ketones, Urine 01/26/2024 NEGATIVE  NEGATIVE mg/dL Final    Bilirubin, Urine 01/26/2024 NEGATIVE  NEGATIVE Final    Urobilinogen, Urine 01/26/2024 <2.0  <2.0 mg/dL Final    Nitrite, Urine 01/26/2024 NEGATIVE  NEGATIVE Final    Leukocyte Esterase, Urine 01/26/2024 NEGATIVE  NEGATIVE Final   Admission on 01/21/2024, Discharged on 01/22/2024   Component Date Value Ref Range Status    WBC 01/21/2024 8.4  4.4 - 11.3 x10*3/uL Final    nRBC 01/21/2024 0.0  0.0 - 0.0 /100 WBCs Final    RBC 01/21/2024 4.32 (L)  4.50 - 5.90 x10*6/uL Final    Hemoglobin 01/21/2024 13.1 (L)  13.5 - 17.5 g/dL Final    Hematocrit 01/21/2024 40.3 (L)  41.0 - 52.0 % Final    MCV 01/21/2024 93  80 - 100 fL Final    MCH 01/21/2024 30.3  26.0 - 34.0 pg Final    MCHC 01/21/2024 32.5  32.0 - 36.0 g/dL Final    RDW 01/21/2024 14.1  11.5 - 14.5 % Final    Platelets 01/21/2024 210  150 - 450 x10*3/uL Final    Neutrophils % 01/21/2024 81.8  40.0 - 80.0 % Final    Immature Granulocytes %, Automated 01/21/2024 0.5  0.0 - 0.9 % Final    Immature Granulocyte Count (IG) includes promyelocytes,  [General Appearance - In No Acute Distress] : in no acute distress [Sclera] : the sclera and conjunctiva were normal myelocytes and metamyelocytes but does not include bands. Percent differential counts (%) should be interpreted in the context of the absolute cell counts (cells/UL).    Lymphocytes % 01/21/2024 11.4  13.0 - 44.0 % Final    Monocytes % 01/21/2024 5.0  2.0 - 10.0 % Final    Eosinophils % 01/21/2024 0.8  0.0 - 6.0 % Final    Basophils % 01/21/2024 0.5  0.0 - 2.0 % Final    Neutrophils Absolute 01/21/2024 6.88 (H)  1.60 - 5.50 x10*3/uL Final    Percent differential counts (%) should be interpreted in the context of the absolute cell counts (cells/uL).    Immature Granulocytes Absolute, Au* 01/21/2024 0.04  0.00 - 0.50 x10*3/uL Final    Lymphocytes Absolute 01/21/2024 0.96  0.80 - 3.00 x10*3/uL Final    Monocytes Absolute 01/21/2024 0.42  0.05 - 0.80 x10*3/uL Final    Eosinophils Absolute 01/21/2024 0.07  0.00 - 0.40 x10*3/uL Final    Basophils Absolute 01/21/2024 0.04  0.00 - 0.10 x10*3/uL Final    Glucose 01/21/2024 122 (H)  74 - 99 mg/dL Final    Sodium 01/21/2024 139  136 - 145 mmol/L Final    Potassium 01/21/2024 4.6  3.5 - 5.3 mmol/L Final    Chloride 01/21/2024 106  98 - 107 mmol/L Final    Bicarbonate 01/21/2024 25  21 - 32 mmol/L Final    Anion Gap 01/21/2024 13  10 - 20 mmol/L Final    Urea Nitrogen 01/21/2024 29 (H)  6 - 23 mg/dL Final    Creatinine 01/21/2024 1.32 (H)  0.50 - 1.30 mg/dL Final    eGFR 01/21/2024 55 (L)  >60 mL/min/1.73m*2 Final    Calculations of estimated GFR are performed using the 2021 CKD-EPI Study Refit equation without the race variable for the IDMS-Traceable creatinine methods.  https://jasn.asnjournals.org/content/early/2021/09/22/ASN.2023514465    Calcium 01/21/2024 8.9  8.6 - 10.3 mg/dL Final    Albumin 01/21/2024 3.5  3.4 - 5.0 g/dL Final    Alkaline Phosphatase 01/21/2024 94  33 - 136 U/L Final    Total Protein 01/21/2024 6.3 (L)  6.4 - 8.2 g/dL Final    AST 01/21/2024 15  9 - 39 U/L Final    Bilirubin, Total 01/21/2024 0.6  0.0 - 1.2 mg/dL Final    ALT 01/21/2024 8 (L)  10 - 52  U/L Final    Patients treated with Sulfasalazine may generate falsely decreased results for ALT.    Troponin I, High Sensitivity 01/21/2024 14  0 - 20 ng/L Final    BNP 01/21/2024 125 (H)  0 - 99 pg/mL Final    Ventricular Rate 01/21/2024 104  BPM Final    QRS Duration 01/21/2024 76  ms Final    QT Interval 01/21/2024 334  ms Final    QTC Calculation(Bazett) 01/21/2024 439  ms Final    R Axis 01/21/2024 63  degrees Final    T Axis 01/21/2024 17  degrees Final    QRS Count 01/21/2024 17  beats Final    Q Onset 01/21/2024 224  ms Final    T Offset 01/21/2024 391  ms Final    QTC Fredericia 01/21/2024 401  ms Final    Color, Urine 01/21/2024 Yellow  Straw, Yellow Final    Appearance, Urine 01/21/2024 Clear  Clear Final    Specific Gravity, Urine 01/21/2024 1.014  1.005 - 1.035 Final    pH, Urine 01/21/2024 6.0  5.0, 5.5, 6.0, 6.5, 7.0, 7.5, 8.0 Final    Protein, Urine 01/21/2024 NEGATIVE  NEGATIVE mg/dL Final    Glucose, Urine 01/21/2024 NEGATIVE  NEGATIVE mg/dL Final    Blood, Urine 01/21/2024 NEGATIVE  NEGATIVE Final    Ketones, Urine 01/21/2024 NEGATIVE  NEGATIVE mg/dL Final    Bilirubin, Urine 01/21/2024 NEGATIVE  NEGATIVE Final    Urobilinogen, Urine 01/21/2024 <2.0  <2.0 mg/dL Final    Nitrite, Urine 01/21/2024 NEGATIVE  NEGATIVE Final    Leukocyte Esterase, Urine 01/21/2024 MODERATE (2+) (A)  NEGATIVE Final    Extra Tube 01/21/2024 Hold for add-ons.   Final    Auto resulted.    WBC, Urine 01/21/2024 1-5  1-5, NONE /HPF Final    RBC, Urine 01/21/2024 1-2  NONE, 1-2, 3-5 /HPF Final    Squamous Epithelial Cells, Urine 01/21/2024 1-9 (SPARSE)  Reference range not established. /HPF Final    Bacteria, Urine 01/21/2024 1+ (A)  NONE SEEN /HPF Final    Mucus, Urine 01/21/2024 1+  Reference range not established. /LPF Final    Hyaline Casts, Urine 01/21/2024 OCCASIONAL (A)  NONE /LPF Final    Calcium Oxalate Crystals, Urine 01/21/2024 2+ (A)  NONE, 1+ /HPF Final    Urine Culture 01/21/2024 No growth   Final   Admission on  [PERRL With Normal Accommodation] : pupils were equal in size, round, and reactive to light 12/21/2023, Discharged on 12/24/2023   Component Date Value Ref Range Status    Ventricular Rate 12/21/2023 100  BPM Final    QRS Duration 12/21/2023 66  ms Final    QT Interval 12/21/2023 326  ms Final    QTC Calculation(Bazett) 12/21/2023 420  ms Final    R Axis 12/21/2023 63  degrees Final    T Axis 12/21/2023 10  degrees Final    QRS Count 12/21/2023 17  beats Final    Q Onset 12/21/2023 228  ms Final    T Offset 12/21/2023 391  ms Final    QTC Fredericia 12/21/2023 386  ms Final    Glucose 12/21/2023 100 (H)  74 - 99 mg/dL Final    Sodium 12/21/2023 139  136 - 145 mmol/L Final    Potassium 12/21/2023 5.1  3.5 - 5.3 mmol/L Final    Chloride 12/21/2023 105  98 - 107 mmol/L Final    Bicarbonate 12/21/2023 24  21 - 32 mmol/L Final    Anion Gap 12/21/2023 15  10 - 20 mmol/L Final    Urea Nitrogen 12/21/2023 45 (H)  6 - 23 mg/dL Final    Creatinine 12/21/2023 1.59 (H)  0.50 - 1.30 mg/dL Final    eGFR 12/21/2023 44 (L)  >60 mL/min/1.73m*2 Final    Calculations of estimated GFR are performed using the 2021 CKD-EPI Study Refit equation without the race variable for the IDMS-Traceable creatinine methods.  https://jasn.asnjournals.org/content/early/2021/09/22/ASN.2217916023    Calcium 12/21/2023 9.6  8.6 - 10.3 mg/dL Final    Albumin 12/21/2023 4.0  3.4 - 5.0 g/dL Final    Alkaline Phosphatase 12/21/2023 83  33 - 136 U/L Final    Total Protein 12/21/2023 7.2  6.4 - 8.2 g/dL Final    AST 12/21/2023 15  9 - 39 U/L Final    Bilirubin, Total 12/21/2023 0.8  0.0 - 1.2 mg/dL Final    ALT 12/21/2023 10  10 - 52 U/L Final    Patients treated with Sulfasalazine may generate falsely decreased results for ALT.    WBC 12/21/2023 12.9 (H)  4.4 - 11.3 x10*3/uL Final    nRBC 12/21/2023 0.0  0.0 - 0.0 /100 WBCs Final    RBC 12/21/2023 4.08 (L)  4.50 - 5.90 x10*6/uL Final    Hemoglobin 12/21/2023 12.4 (L)  13.5 - 17.5 g/dL Final    Hematocrit 12/21/2023 38.4 (L)  41.0 - 52.0 % Final    MCV 12/21/2023 94  80 - 100 fL Final    MCH  [Outer Ear] : the ears and nose were normal in appearance 12/21/2023 30.4  26.0 - 34.0 pg Final    MCHC 12/21/2023 32.3  32.0 - 36.0 g/dL Final    RDW 12/21/2023 14.2  11.5 - 14.5 % Final    Platelets 12/21/2023 225  150 - 450 x10*3/uL Final    Neutrophils % 12/21/2023 88.9  40.0 - 80.0 % Final    Immature Granulocytes %, Automated 12/21/2023 0.4  0.0 - 0.9 % Final    Immature Granulocyte Count (IG) includes promyelocytes, myelocytes and metamyelocytes but does not include bands. Percent differential counts (%) should be interpreted in the context of the absolute cell counts (cells/UL).    Lymphocytes % 12/21/2023 5.6  13.0 - 44.0 % Final    Monocytes % 12/21/2023 4.8  2.0 - 10.0 % Final    Eosinophils % 12/21/2023 0.0  0.0 - 6.0 % Final    Basophils % 12/21/2023 0.3  0.0 - 2.0 % Final    Neutrophils Absolute 12/21/2023 11.45 (H)  1.60 - 5.50 x10*3/uL Final    Percent differential counts (%) should be interpreted in the context of the absolute cell counts (cells/uL).    Immature Granulocytes Absolute, Au* 12/21/2023 0.05  0.00 - 0.50 x10*3/uL Final    Lymphocytes Absolute 12/21/2023 0.72 (L)  0.80 - 3.00 x10*3/uL Final    Monocytes Absolute 12/21/2023 0.62  0.05 - 0.80 x10*3/uL Final    Eosinophils Absolute 12/21/2023 0.00  0.00 - 0.40 x10*3/uL Final    Basophils Absolute 12/21/2023 0.04  0.00 - 0.10 x10*3/uL Final    Flu A Result 12/21/2023 Not Detected  Not Detected Final    Flu B Result 12/21/2023 Not Detected  Not Detected Final    Coronavirus 2019, PCR 12/21/2023 Not Detected  Not Detected Final    Troponin I, High Sensitivity 12/21/2023 17  0 - 20 ng/L Final    Troponin I, High Sensitivity 12/21/2023 15  0 - 20 ng/L Final    WBC 12/22/2023 8.7  4.4 - 11.3 x10*3/uL Final    nRBC 12/22/2023 0.0  0.0 - 0.0 /100 WBCs Final    RBC 12/22/2023 3.99 (L)  4.50 - 5.90 x10*6/uL Final    Hemoglobin 12/22/2023 12.0 (L)  13.5 - 17.5 g/dL Final    Hematocrit 12/22/2023 38.5 (L)  41.0 - 52.0 % Final    MCV 12/22/2023 97  80 - 100 fL Final    MCH 12/22/2023 30.1  26.0 - 34.0 pg Final  [Oropharynx] : the oropharynx was normal    MCHC 12/22/2023 31.2 (L)  32.0 - 36.0 g/dL Final    RDW 12/22/2023 14.0  11.5 - 14.5 % Final    Platelets 12/22/2023 205  150 - 450 x10*3/uL Final    Neutrophils % 12/22/2023 76.0  40.0 - 80.0 % Final    Immature Granulocytes %, Automated 12/22/2023 0.3  0.0 - 0.9 % Final    Immature Granulocyte Count (IG) includes promyelocytes, myelocytes and metamyelocytes but does not include bands. Percent differential counts (%) should be interpreted in the context of the absolute cell counts (cells/UL).    Lymphocytes % 12/22/2023 14.9  13.0 - 44.0 % Final    Monocytes % 12/22/2023 7.6  2.0 - 10.0 % Final    Eosinophils % 12/22/2023 0.9  0.0 - 6.0 % Final    Basophils % 12/22/2023 0.3  0.0 - 2.0 % Final    Neutrophils Absolute 12/22/2023 6.61 (H)  1.60 - 5.50 x10*3/uL Final    Percent differential counts (%) should be interpreted in the context of the absolute cell counts (cells/uL).    Immature Granulocytes Absolute, Au* 12/22/2023 0.03  0.00 - 0.50 x10*3/uL Final    Lymphocytes Absolute 12/22/2023 1.30  0.80 - 3.00 x10*3/uL Final    Monocytes Absolute 12/22/2023 0.66  0.05 - 0.80 x10*3/uL Final    Eosinophils Absolute 12/22/2023 0.08  0.00 - 0.40 x10*3/uL Final    Basophils Absolute 12/22/2023 0.03  0.00 - 0.10 x10*3/uL Final    Magnesium 12/22/2023 2.00  1.60 - 2.40 mg/dL Final    Glucose 12/22/2023 88  74 - 99 mg/dL Final    Sodium 12/22/2023 141  136 - 145 mmol/L Final    Potassium 12/22/2023 3.9  3.5 - 5.3 mmol/L Final    Chloride 12/22/2023 110 (H)  98 - 107 mmol/L Final    Bicarbonate 12/22/2023 22  21 - 32 mmol/L Final    Anion Gap 12/22/2023 13  10 - 20 mmol/L Final    Urea Nitrogen 12/22/2023 38 (H)  6 - 23 mg/dL Final    Creatinine 12/22/2023 1.16  0.50 - 1.30 mg/dL Final    eGFR 12/22/2023 64  >60 mL/min/1.73m*2 Final    Calculations of estimated GFR are performed using the 2021 CKD-EPI Study Refit equation without the race variable for the IDMS-Traceable creatinine  [Extraocular Movements] : extraocular movements were intact [Neck Appearance] : the appearance of the neck was normal methods.  https://jasn.asnjournals.org/content/early/2021/09/22/ASN.9997497546    Calcium 12/22/2023 8.8  8.6 - 10.3 mg/dL Final    Ventricular Rate 12/22/2023 99  BPM Final    QRS Duration 12/22/2023 68  ms Final    QT Interval 12/22/2023 326  ms Final    QTC Calculation(Bazett) 12/22/2023 418  ms Final    R Axis 12/22/2023 61  degrees Final    T Axis 12/22/2023 14  degrees Final    QRS Count 12/22/2023 17  beats Final    Q Onset 12/22/2023 227  ms Final    T Offset 12/22/2023 390  ms Final    QTC Fredericia 12/22/2023 385  ms Final    WBC 12/23/2023 8.7  4.4 - 11.3 x10*3/uL Final    nRBC 12/23/2023 0.0  0.0 - 0.0 /100 WBCs Final    RBC 12/23/2023 4.15 (L)  4.50 - 5.90 x10*6/uL Final    Hemoglobin 12/23/2023 12.6 (L)  13.5 - 17.5 g/dL Final    Hematocrit 12/23/2023 39.5 (L)  41.0 - 52.0 % Final    MCV 12/23/2023 95  80 - 100 fL Final    MCH 12/23/2023 30.4  26.0 - 34.0 pg Final    MCHC 12/23/2023 31.9 (L)  32.0 - 36.0 g/dL Final    RDW 12/23/2023 13.7  11.5 - 14.5 % Final    Platelets 12/23/2023 237  150 - 450 x10*3/uL Final    Glucose 12/23/2023 86  74 - 99 mg/dL Final    Sodium 12/23/2023 143  136 - 145 mmol/L Final    Potassium 12/23/2023 3.9  3.5 - 5.3 mmol/L Final    Chloride 12/23/2023 110 (H)  98 - 107 mmol/L Final    Bicarbonate 12/23/2023 24  21 - 32 mmol/L Final    Anion Gap 12/23/2023 13  10 - 20 mmol/L Final    Urea Nitrogen 12/23/2023 30 (H)  6 - 23 mg/dL Final    Creatinine 12/23/2023 1.09  0.50 - 1.30 mg/dL Final    eGFR 12/23/2023 69  >60 mL/min/1.73m*2 Final    Calculations of estimated GFR are performed using the 2021 CKD-EPI Study Refit equation without the race variable for the IDMS-Traceable creatinine methods.  https://jasn.asnjournals.org/content/early/2021/09/22/[email or web address]    Calcium 12/23/2023 8.7  8.6 - 10.3 mg/dL Final    Extra Tube 12/23/2023 Hold for add-ons.   Final    Auto resulted.    Extra Tube 12/23/2023 Hold for add-ons.   Final    Auto resulted.   Admission on 12/11/2023,  Discharged on 12/11/2023   Component Date Value Ref Range Status    WBC 12/11/2023 6.5  4.4 - 11.3 x10*3/uL Final    nRBC 12/11/2023 0.0  0.0 - 0.0 /100 WBCs Final    RBC 12/11/2023 4.39 (L)  4.50 - 5.90 x10*6/uL Final    Hemoglobin 12/11/2023 13.3 (L)  13.5 - 17.5 g/dL Final    Hematocrit 12/11/2023 41.7  41.0 - 52.0 % Final    MCV 12/11/2023 95  80 - 100 fL Final    MCH 12/11/2023 30.3  26.0 - 34.0 pg Final    MCHC 12/11/2023 31.9 (L)  32.0 - 36.0 g/dL Final    RDW 12/11/2023 14.0  11.5 - 14.5 % Final    Platelets 12/11/2023 223  150 - 450 x10*3/uL Final    Glucose 12/11/2023 95  74 - 99 mg/dL Final    Sodium 12/11/2023 140  136 - 145 mmol/L Final    Potassium 12/11/2023 4.1  3.5 - 5.3 mmol/L Final    Chloride 12/11/2023 107  98 - 107 mmol/L Final    Bicarbonate 12/11/2023 26  21 - 32 mmol/L Final    Anion Gap 12/11/2023 11  10 - 20 mmol/L Final    Urea Nitrogen 12/11/2023 25 (H)  6 - 23 mg/dL Final    Creatinine 12/11/2023 1.01  0.50 - 1.30 mg/dL Final    eGFR 12/11/2023 76  >60 mL/min/1.73m*2 Final    Calculations of estimated GFR are performed using the 2021 CKD-EPI Study Refit equation without the race variable for the IDMS-Traceable creatinine methods.  https://jasn.asnjournals.org/content/early/2021/09/22/ASN.0688068710    Calcium 12/11/2023 9.1  8.6 - 10.3 mg/dL Final    Albumin 12/11/2023 3.6  3.4 - 5.0 g/dL Final    Alkaline Phosphatase 12/11/2023 76  33 - 136 U/L Final    Total Protein 12/11/2023 6.2 (L)  6.4 - 8.2 g/dL Final    AST 12/11/2023 12  9 - 39 U/L Final    Bilirubin, Total 12/11/2023 0.3  0.0 - 1.2 mg/dL Final    ALT 12/11/2023 8 (L)  10 - 52 U/L Final    Patients treated with Sulfasalazine may generate falsely decreased results for ALT.    Protime 12/11/2023 11.2  9.8 - 12.8 seconds Final    INR 12/11/2023 1.0  0.9 - 1.1 Final    aPTT 12/11/2023 31  27 - 38 seconds Final   Admission on 11/01/2023, Discharged on 11/03/2023   Component Date Value Ref Range Status    Creatine Kinase 11/01/2023  [Neck Cervical Mass (___cm)] : no neck mass was observed 74  0 - 325 U/L Final    Magnesium 11/01/2023 2.00  1.60 - 2.40 mg/dL Final    Phosphorus 11/01/2023 3.4  2.5 - 4.9 mg/dL Final    The performance characteristics of phosphorus testing in heparinized plasma have been validated by the individual  laboratory site where testing is performed. Testing on heparinized plasma is not approved by the FDA; however, such approval is not necessary.    WBC 11/01/2023 8.4  4.4 - 11.3 x10*3/uL Final    nRBC 11/01/2023 0.0  0.0 - 0.0 /100 WBCs Final    RBC 11/01/2023 4.13 (L)  4.50 - 5.90 x10*6/uL Final    Hemoglobin 11/01/2023 12.3 (L)  13.5 - 17.5 g/dL Final    Hematocrit 11/01/2023 38.5 (L)  41.0 - 52.0 % Final    MCV 11/01/2023 93  80 - 100 fL Final    MCH 11/01/2023 29.8  26.0 - 34.0 pg Final    MCHC 11/01/2023 31.9 (L)  32.0 - 36.0 g/dL Final    RDW 11/01/2023 15.7 (H)  11.5 - 14.5 % Final    Platelets 11/01/2023 260  150 - 450 x10*3/uL Final    Glucose 11/01/2023 110 (H)  74 - 99 mg/dL Final    Sodium 11/01/2023 144  136 - 145 mmol/L Final    Potassium 11/01/2023 5.0  3.5 - 5.3 mmol/L Final    Chloride 11/01/2023 107  98 - 107 mmol/L Final    Bicarbonate 11/01/2023 27  21 - 32 mmol/L Final    Anion Gap 11/01/2023 15  10 - 20 mmol/L Final    Urea Nitrogen 11/01/2023 40 (H)  6 - 23 mg/dL Final    Creatinine 11/01/2023 1.34 (H)  0.50 - 1.30 mg/dL Final    eGFR 11/01/2023 54 (L)  >60 mL/min/1.73m*2 Final    Calculations of estimated GFR are performed using the 2021 CKD-EPI Study Refit equation without the race variable for the IDMS-Traceable creatinine methods.  https://jasn.asnjournals.org/content/early/2021/09/22/ASN.5527444591    Calcium 11/01/2023 9.3  8.6 - 10.3 mg/dL Final    Albumin 11/01/2023 4.1  3.4 - 5.0 g/dL Final    Alkaline Phosphatase 11/01/2023 95  33 - 136 U/L Final    Total Protein 11/01/2023 7.2  6.4 - 8.2 g/dL Final    AST 11/01/2023 12  9 - 39 U/L Final    Bilirubin, Total 11/01/2023 0.4  0.0 - 1.2 mg/dL Final    ALT 11/01/2023 10  10 - 52 U/L Final     [Jugular Venous Distention Increased] : there was no jugular-venous distention Patients treated with Sulfasalazine may generate falsely decreased results for ALT.    Protime 11/01/2023 11.2  9.8 - 12.8 seconds Final    INR 11/01/2023 1.0  0.9 - 1.1 Final    Ventricular Rate 01/26/2024 108  BPM Final    QRS Duration 01/26/2024 78  ms Final    QT Interval 01/26/2024 294  ms Final    QTC Calculation(Bazett) 01/26/2024 393  ms Final    R Axis 01/26/2024 83  degrees Final    T Axis 01/26/2024 -19  degrees Final    QRS Count 01/26/2024 18  beats Final    Q Onset 01/26/2024 221  ms Final    T Offset 01/26/2024 368  ms Final    QTC Fredericia 01/26/2024 357  ms Final    Color, Urine 11/01/2023 Straw  Straw, Yellow Final    Appearance, Urine 11/01/2023 Clear  Clear Final    Specific Gravity, Urine 11/01/2023 1.004 (N)  1.005 - 1.035 Final    pH, Urine 11/01/2023 7.0  5.0, 5.5, 6.0, 6.5, 7.0, 7.5, 8.0 Final    Protein, Urine 11/01/2023 NEGATIVE  NEGATIVE mg/dL Final    Glucose, Urine 11/01/2023 NEGATIVE  NEGATIVE mg/dL Final    Blood, Urine 11/01/2023 NEGATIVE  NEGATIVE Final    Ketones, Urine 11/01/2023 NEGATIVE  NEGATIVE mg/dL Final    Bilirubin, Urine 11/01/2023 NEGATIVE  NEGATIVE Final    Urobilinogen, Urine 11/01/2023 <2.0  <2.0 mg/dL Final    Nitrite, Urine 11/01/2023 NEGATIVE  NEGATIVE Final    Leukocyte Esterase, Urine 11/01/2023 SMALL (1+) (A)  NEGATIVE Final    Extra Tube 11/01/2023 Hold for add-ons.   Final    Auto resulted.    WBC, Urine 11/01/2023 1-5  1-5, NONE /HPF Final    RBC, Urine 11/01/2023 NONE  NONE, 1-2, 3-5 /HPF Final    Bacteria, Urine 11/01/2023 1+ (A)  NONE SEEN /HPF Final    Urine Culture 11/01/2023 20,000 - 80,000 Escherichia coli (A)   Final    WBC 11/02/2023 10.1  4.4 - 11.3 x10*3/uL Final    nRBC 11/02/2023 0.0  0.0 - 0.0 /100 WBCs Final    RBC 11/02/2023 4.86  4.50 - 5.90 x10*6/uL Final    Hemoglobin 11/02/2023 14.5  13.5 - 17.5 g/dL Final    Hematocrit 11/02/2023 46.3  41.0 - 52.0 % Final    MCV 11/02/2023 95  80 - 100 fL Final    MCH 11/02/2023 29.8  26.0 - 34.0 pg  [Thyroid Diffuse Enlargement] : the thyroid was not enlarged Final    MCHC 11/02/2023 31.3 (L)  32.0 - 36.0 g/dL Final    RDW 11/02/2023 15.3 (H)  11.5 - 14.5 % Final    Platelets 11/02/2023 281  150 - 450 x10*3/uL Final    Glucose 11/02/2023 166 (H)  74 - 99 mg/dL Final    Sodium 11/02/2023 140  136 - 145 mmol/L Final    Potassium 11/02/2023 5.8 (H)  3.5 - 5.3 mmol/L Final    Chloride 11/02/2023 109 (H)  98 - 107 mmol/L Final    Bicarbonate 11/02/2023 22  21 - 32 mmol/L Final    Anion Gap 11/02/2023 15  10 - 20 mmol/L Final    Urea Nitrogen 11/02/2023 29 (H)  6 - 23 mg/dL Final    Creatinine 11/02/2023 1.15  0.50 - 1.30 mg/dL Final    eGFR 11/02/2023 65  >60 mL/min/1.73m*2 Final    Calculations of estimated GFR are performed using the 2021 CKD-EPI Study Refit equation without the race variable for the IDMS-Traceable creatinine methods.  https://jasn.asnjournals.org/content/early/2021/09/22/ASN.6994199936    Calcium 11/02/2023 9.2  8.6 - 10.3 mg/dL Final    Troponin I, High Sensitivity 11/02/2023 10  0 - 20 ng/L Final    Potassium 11/02/2023 4.9  3.5 - 5.3 mmol/L Final    WBC 11/03/2023 8.2  4.4 - 11.3 x10*3/uL Final    nRBC 11/03/2023 0.0  0.0 - 0.0 /100 WBCs Final    RBC 11/03/2023 4.43 (L)  4.50 - 5.90 x10*6/uL Final    Hemoglobin 11/03/2023 13.2 (L)  13.5 - 17.5 g/dL Final    Hematocrit 11/03/2023 41.9  41.0 - 52.0 % Final    MCV 11/03/2023 95  80 - 100 fL Final    MCH 11/03/2023 29.8  26.0 - 34.0 pg Final    MCHC 11/03/2023 31.5 (L)  32.0 - 36.0 g/dL Final    RDW 11/03/2023 15.2 (H)  11.5 - 14.5 % Final    Platelets 11/03/2023 247  150 - 450 x10*3/uL Final    Glucose 11/03/2023 75  74 - 99 mg/dL Final    Sodium 11/03/2023 140  136 - 145 mmol/L Final    Potassium 11/03/2023 3.9  3.5 - 5.3 mmol/L Final    Chloride 11/03/2023 108 (H)  98 - 107 mmol/L Final    Bicarbonate 11/03/2023 22  21 - 32 mmol/L Final    Anion Gap 11/03/2023 14  10 - 20 mmol/L Final    Urea Nitrogen 11/03/2023 26 (H)  6 - 23 mg/dL Final    Creatinine 11/03/2023 1.05  0.50 - 1.30 mg/dL Final    eGFR  [Thyroid Nodule] : there were no palpable thyroid nodules 11/03/2023 73  >60 mL/min/1.73m*2 Final    Calculations of estimated GFR are performed using the 2021 CKD-EPI Study Refit equation without the race variable for the IDMS-Traceable creatinine methods.  https://jasn.asnjournals.org/content/early/2021/09/22/ASN.2963719131    Calcium 11/03/2023 8.8  8.6 - 10.3 mg/dL Final       Review of Systems:  The systems were reviewed with pertinent positives and negatives documented in the HPI.     Problems Assessed/Relevant:  Problem List Items Addressed This Visit    None    Past Medical History:   Diagnosis Date    Personal history of other diseases of the circulatory system     History of hypertension    Personal history of other diseases of the circulatory system 06/04/2018    History of atrial fibrillation    Personal history of other endocrine, nutritional and metabolic disease     History of hyperlipidemia    Personal history of other medical treatment     History of cardioversion    Unspecified atrial fibrillation (Multi)     Atrial fibrillation status post cardioversion     Past Surgical History:   Procedure Laterality Date    MR HEAD ANGIO WO IV CONTRAST  2/25/2023    MR HEAD ANGIO WO IV CONTRAST PAR MRI    MR NECK ANGIO WO IV CONTRAST  2/25/2023    MR NECK ANGIO WO IV CONTRAST PAR MRI     No family history on file.  Social History     Tobacco Use    Smoking status: Former     Types: Cigarettes     Passive exposure: Never (unable to understand)    Smokeless tobacco: Not on file   Substance Use Topics    Alcohol use: Not on file      No Known Allergies     Medications:    Current Outpatient Medications:     acetaminophen (Tylenol) 325 mg tablet, Take 2 tablets (650 mg) by mouth every 6 hours if needed for mild pain (1 - 3)., Disp: , Rfl:     albuterol 90 mcg/actuation inhaler, Inhale 2 puffs every 4 hours if needed for shortness of breath., Disp: , Rfl:     amiodarone (Pacerone) 200 mg tablet, Take 1 tablet (200 mg) by mouth once daily., Disp: , Rfl:     cholecalciferol  [Heart Rate And Rhythm] : heart rate was normal and rhythm regular "(Vitamin D-3) 25 MCG (1000 UT) tablet, Take 1 tablet (25 mcg) by mouth once daily., Disp: , Rfl:     gabapentin (Neurontin) 100 mg capsule, Take 1 capsule (100 mg) by mouth 2 times a day. For neuropathy., Disp: , Rfl:     lactase (Lactaid) 3,000 unit tablet, Take 1 tablet by mouth 3 times a day before meals. For dairy intolerance., Disp: , Rfl:     lisinopril 10 mg tablet, Take 1 tablet (10 mg) by mouth once daily., Disp: , Rfl:     melatonin 10 mg tablet, Take 1 tablet (10 mg) by mouth as needed at bedtime (FOR INSOMNIA)., Disp: , Rfl:     nicotine polacrilex (Commit) 2 mg lozenge, Take 1 lozenge (2 mg) by mouth every 4 hours if needed for smoking cessation., Disp: , Rfl:     ondansetron (Zofran) 4 mg tablet, Take 1 tablet (4 mg) by mouth every 4 hours if needed for nausea or vomiting., Disp: , Rfl:     sertraline (Zoloft) 100 mg tablet, Take 1 tablet (100 mg) by mouth once daily. FOR DEPRESSION, Disp: , Rfl:     tamsulosin (Flomax) 0.4 mg 24 hr capsule, Take 1 capsule (0.4 mg) by mouth once daily at bedtime., Disp: , Rfl:     loperamide (Imodium) 2 mg capsule, Take 1 capsule (2 mg) by mouth every 8 hours if needed for diarrhea. (Patient not taking: Reported on 10/30/2024), Disp: , Rfl:     LORazepam (Ativan) 1 mg tablet, Take 1 tablet (1 mg) by mouth every 8 hours if needed for anxiety. (Patient not taking: Reported on 10/30/2024), Disp: , Rfl:     psyllium (Metamucil) 3.4 gram packet, Take 1 packet by mouth once daily. (Patient not taking: Reported on 10/30/2024), Disp: , Rfl:            Results:     The following labs, imaging, and results were personally reviewed and demonstrated:    Labs:  Lab Results   Component Value Date    HGBA1C 5.6 02/25/2023          Lab Results   Component Value Date    CKTOTAL 74 11/01/2023     No results found for: \"SPEP\"  CBC:   Lab Results   Component Value Date    WBC 8.6 05/31/2024    HGB 12.8 (L) 05/31/2024    HCT 41.6 05/31/2024     05/31/2024     BMP:   Lab Results " [Auscultation Breath Sounds / Voice Sounds] : lungs were clear to auscultation bilaterally "  Component Value Date     05/31/2024    K 5.3 05/31/2024     05/31/2024    CO2 26 05/31/2024    BUN 35 (H) 05/31/2024    CREATININE 1.53 (H) 05/31/2024    CALCIUM 8.6 05/31/2024    MG 1.80 01/26/2024    PHOS 3.4 11/01/2023     LFT:   Lab Results   Component Value Date    ALKPHOS 102 05/31/2024    BILITOT 0.4 05/31/2024    BILIDIR 0.0 09/18/2023    PROT 6.6 05/31/2024    ALBUMIN 3.5 05/31/2024    ALT 12 05/31/2024    AST 14 05/31/2024           Physical Exam:     General Physical Exam:  /75   Pulse 85   Ht 1.676 m (5' 6\")   Wt 54.4 kg (120 lb)   BMI 19.37 kg/m²      He is not in any acute distress.    Musculoskeletal: No scoliosis, lordosis, kyphosis, pes cavus, or hammertoes     Neurological Exam:   Limited exam due to the aphasia.        Cranial nerves:  CN 2   Right homonymous hemianopsia  CN 3, 4, 6   Pupils round, 4 mm in diameter, equally reactive to light. Lids symmetric; no ptosis.      CN 7   Right facial droop.     Mild slurred speech.       Motor:  Right spastic hemiparesis.      Reflexes:   Hype-reflexia right upper and lower extremities.      Plantars: toes downgoing to plantar stimulation. No clonus or other pathologic reflexes present.      Sensory:   Equal withdrawal to painful stimulation.        Coordination/Gait:   Not tested.       Impression/Plan:   Rodri Whitfield is a 79 y.o. who presents with  a witnessed mechanical fall by a left MCA CVA, HTN,  HLD, A-fib (on warfarin), CVA with residual speech difficulty and  right sided weakness, s/p craniotomy , by a history of cerebral aneurysm with no evidence of an acute focal CNS pathology by history, physical exam, Ct scan of the head and neck.    I suggest:  - Pt/OT evaluation and treatment  - Close blood pressure and cardiac rhythm monitoring.    - To maximize the treatment of his atrial fibrillation, HTN, and HLD  with the following goals BP < 120/70 mm/hg, LDL < 70, HgbA1C< 5.6  - To follow up with Cardiology ( Will differ the " [Heart Sounds] : normal S1 and S2 [Heart Sounds Gallop] : no gallops cardiac event monitoring to his Cardiologist)  - To follow up with neurosurgery by a history of cerebral aneurysm.    The patient to follow up in my office as needed.         Reviewed and approved by MOR KAUFMAN on 10/30/24 at 12:05 PM.    I personally spent [60 ] minutes on the day of the visit completing the review of the medical record and outside records, obtaining history and performing an appropriate physical exam, patient care, counseling and education, placing orders, independently reviewing results, communicating with the patient/family and other providers, coordinating care and performing appropriate clinical documentation.      oMr Kaufman M.D.     [Murmurs] : no murmurs [Heart Sounds Pericardial Friction Rub] : no pericardial rub [Edema] : there was no peripheral edema [Bowel Sounds] : normal bowel sounds [Abdomen Soft] : soft [Abdomen Tenderness] : non-tender [Abdomen Mass (___ Cm)] : no abdominal mass palpated [No CVA Tenderness] : no ~M costovertebral angle tenderness [Abnormal Walk] : normal gait [Nail Clubbing] : no clubbing  or cyanosis of the fingernails [No Spinal Tenderness] : no spinal tenderness [Motor Tone] : muscle strength and tone were normal [Musculoskeletal - Swelling] : no joint swelling seen [Skin Color & Pigmentation] : normal skin color and pigmentation [Cranial Nerves] : cranial nerves 2-12 were intact [] : no rash [Skin Turgor] : normal skin turgor [No Focal Deficits] : no focal deficits [Motor Exam] : the motor exam was normal [Oriented To Time, Place, And Person] : oriented to person, place, and time [Impaired Insight] : insight and judgment were intact [Affect] : the affect was normal